# Patient Record
Sex: FEMALE | Race: OTHER | HISPANIC OR LATINO | Employment: FULL TIME | URBAN - METROPOLITAN AREA
[De-identification: names, ages, dates, MRNs, and addresses within clinical notes are randomized per-mention and may not be internally consistent; named-entity substitution may affect disease eponyms.]

---

## 2020-07-22 PROBLEM — U07.1 COVID-19 VIRUS DETECTED: Status: ACTIVE | Noted: 2020-07-22

## 2022-12-20 ENCOUNTER — OFFICE VISIT (OUTPATIENT)
Dept: URGENT CARE | Facility: PHYSICIAN GROUP | Age: 22
End: 2022-12-20
Payer: COMMERCIAL

## 2022-12-20 VITALS
WEIGHT: 222 LBS | BODY MASS INDEX: 36.99 KG/M2 | RESPIRATION RATE: 16 BRPM | SYSTOLIC BLOOD PRESSURE: 120 MMHG | HEIGHT: 65 IN | HEART RATE: 55 BPM | DIASTOLIC BLOOD PRESSURE: 64 MMHG | OXYGEN SATURATION: 97 % | TEMPERATURE: 98.4 F

## 2022-12-20 DIAGNOSIS — R11.0 NAUSEA: ICD-10-CM

## 2022-12-20 DIAGNOSIS — R10.13 EPIGASTRIC PAIN: ICD-10-CM

## 2022-12-20 LAB
APPEARANCE UR: NORMAL
BILIRUB UR STRIP-MCNC: NEGATIVE MG/DL
COLOR UR AUTO: YELLOW
GLUCOSE UR STRIP.AUTO-MCNC: NEGATIVE MG/DL
INT CON NEG: NEGATIVE
INT CON POS: POSITIVE
KETONES UR STRIP.AUTO-MCNC: 15 MG/DL
LEUKOCYTE ESTERASE UR QL STRIP.AUTO: NEGATIVE
NITRITE UR QL STRIP.AUTO: NEGATIVE
PH UR STRIP.AUTO: 5.5 [PH] (ref 5–8)
POC URINE PREGNANCY TEST: NEGATIVE
PROT UR QL STRIP: NEGATIVE MG/DL
RBC UR QL AUTO: NORMAL
SP GR UR STRIP.AUTO: 1.01
UROBILINOGEN UR STRIP-MCNC: 0.2 MG/DL

## 2022-12-20 PROCEDURE — 99213 OFFICE O/P EST LOW 20 MIN: CPT | Performed by: PHYSICIAN ASSISTANT

## 2022-12-20 PROCEDURE — 81025 URINE PREGNANCY TEST: CPT | Performed by: PHYSICIAN ASSISTANT

## 2022-12-20 PROCEDURE — 81002 URINALYSIS NONAUTO W/O SCOPE: CPT | Performed by: PHYSICIAN ASSISTANT

## 2022-12-20 RX ORDER — ONDANSETRON 4 MG/1
4 TABLET, ORALLY DISINTEGRATING ORAL EVERY 8 HOURS PRN
Qty: 10 TABLET | Refills: 0 | Status: SHIPPED | OUTPATIENT
Start: 2022-12-20 | End: 2023-01-03

## 2022-12-20 ASSESSMENT — ENCOUNTER SYMPTOMS
ABDOMINAL PAIN: 1
DIARRHEA: 1
VOMITING: 0
CONSTIPATION: 0
NAUSEA: 1

## 2022-12-20 ASSESSMENT — FIBROSIS 4 INDEX: FIB4 SCORE: 0.45

## 2022-12-20 NOTE — PROGRESS NOTES
Subjective:   Daisy Fitzpatrick is a 22 y.o. female who presents today with   Chief Complaint   Patient presents with    Abdominal Pain     X 1 week with pain in upper stomach after eating with discomfort and bubbling. Pt states that she has been  taking a lot of probiotics and started feeling better and now having muscle soreness on ribcage area for last couple of days         Abdominal Pain  This is a new problem. The current episode started in the past 7 days. The onset quality is gradual. The problem occurs constantly. The problem has been gradually improving since onset. The pain is located in the epigastric region. The pain is mild. The quality of the pain is described as cramping. Associated symptoms include diarrhea and nausea. Pertinent negatives include no constipation or vomiting. Treatments tried: Probiotics. The treatment provided mild relief.   Patient denies any recent travel or recent use of antibiotics.  Patient states her symptoms have resolved since yesterday but she still have the appointment and wanted to be evaluated.    PMH:  has no past medical history of Addisons disease ([facility]), Adrenal disorder ([facility]), Allergy, Anemia, Anxiety, Arrhythmia, Arthritis, Asthma, Blood transfusion without reported diagnosis, Cancer ([facility]), Cataract, CHF (congestive heart failure) ([facility]), Clotting disorder ([facility]), COPD (chronic obstructive pulmonary disease) ([facility]), Cushings syndrome ([facility]), Depression, Diabetes ([facility]), Diabetic neuropathy ([facility]), GERD (gastroesophageal reflux disease), Glaucoma, Goiter, Heart attack ([facility]), Heart murmur, HIV (human immunodeficiency virus infection) ([facility]), Hyperlipidemia, Hypertension, IBD (inflammatory bowel disease), Kidney disease, Meningitis, Migraine, Muscle disorder, Osteoporosis, Parathyroid disorder ([facility]), Pituitary disease ([facility]), Pulmonary emphysema ([facility]), Seizure ([facility]), Sickle cell disease ([facility]), Stroke ([facility]), Substance abuse ([facility]), or Tuberculosis.  MEDS:   Current  "Outpatient Medications:     ondansetron (ZOFRAN ODT) 4 MG TABLET DISPERSIBLE, Take 1 Tablet by mouth every 8 hours as needed for Nausea/Vomiting., Disp: 10 Tablet, Rfl: 0  ALLERGIES: No Known Allergies  SURGHX: No past surgical history on file.  SOCHX:  reports that she has never smoked. She has never used smokeless tobacco. She reports that she does not drink alcohol and does not use drugs.  FH: Reviewed with patient, not pertinent to this visit.       Review of Systems   Gastrointestinal:  Positive for abdominal pain, diarrhea and nausea. Negative for constipation and vomiting.      Objective:   /64 (BP Location: Left arm, Patient Position: Sitting, BP Cuff Size: Adult)   Pulse (!) 55   Temp 36.9 °C (98.4 °F) (Temporal)   Resp 16   Ht 1.651 m (5' 5\")   Wt 101 kg (222 lb)   SpO2 97%   BMI 36.94 kg/m²   Physical Exam  Vitals and nursing note reviewed.   Constitutional:       General: She is not in acute distress.     Appearance: Normal appearance. She is well-developed. She is not ill-appearing or toxic-appearing.   HENT:      Head: Normocephalic and atraumatic.      Right Ear: Hearing normal.      Left Ear: Hearing normal.   Cardiovascular:      Rate and Rhythm: Normal rate and regular rhythm.      Heart sounds: Normal heart sounds.   Pulmonary:      Effort: Pulmonary effort is normal.      Breath sounds: Normal breath sounds.   Abdominal:      General: Bowel sounds are normal. There is no distension.      Palpations: Abdomen is soft.      Tenderness: There is no abdominal tenderness. There is no right CVA tenderness, left CVA tenderness or guarding.   Musculoskeletal:      Comments: Normal movement in all 4 extremities   Skin:     General: Skin is warm and dry.   Neurological:      Mental Status: She is alert.      Coordination: Coordination normal.   Psychiatric:         Mood and Affect: Mood normal.     UA negative for infection  Pregnancy negative    Assessment/Plan:   Assessment    1. Epigastric " pain  - Referral to establish with [facility] PCP  - POCT Urinalysis  - POCT Pregnancy    2. Nausea  - ondansetron (ZOFRAN ODT) 4 MG TABLET DISPERSIBLE; Take 1 Tablet by mouth every 8 hours as needed for Nausea/Vomiting.  Dispense: 10 Tablet; Refill: 0  Symptoms and presentation are consistent with resolving epigastric pain and possible viral gastroenteritis.  We will send in Zofran to help with patient's nausea.  Recommend bland diet and replenishing fluids.  If symptoms persist recommend following up with primary care and referral placed today.  No indication for imaging or further work-up at this time.    Differential diagnosis, natural history, supportive care, and indications for immediate follow-up discussed.   Patient given instructions and understanding of medications and treatment.    If not improving in 3-5 days, F/U with PCP or return to  if symptoms worsen.    Patient agreeable to plan.      Please note that this dictation was created using voice recognition software. I have made every reasonable attempt to correct obvious errors, but I expect that there are errors of grammar and possibly content that I did not discover before finalizing the note.    Guille Hoskins PA-C

## 2022-12-30 ENCOUNTER — TELEPHONE (OUTPATIENT)
Dept: SCHEDULING | Facility: IMAGING CENTER | Age: 22
End: 2022-12-30

## 2023-01-03 ENCOUNTER — OFFICE VISIT (OUTPATIENT)
Dept: MEDICAL GROUP | Facility: PHYSICIAN GROUP | Age: 23
End: 2023-01-03
Attending: PHYSICIAN ASSISTANT
Payer: COMMERCIAL

## 2023-01-03 VITALS
OXYGEN SATURATION: 99 % | HEIGHT: 66 IN | RESPIRATION RATE: 16 BRPM | TEMPERATURE: 98 F | BODY MASS INDEX: 36 KG/M2 | WEIGHT: 224 LBS | DIASTOLIC BLOOD PRESSURE: 62 MMHG | HEART RATE: 88 BPM | SYSTOLIC BLOOD PRESSURE: 110 MMHG

## 2023-01-03 DIAGNOSIS — E66.9 OBESITY (BMI 35.0-39.9 WITHOUT COMORBIDITY): ICD-10-CM

## 2023-01-03 DIAGNOSIS — Z76.89 ENCOUNTER TO ESTABLISH CARE: ICD-10-CM

## 2023-01-03 DIAGNOSIS — Z23 NEED FOR VACCINATION: ICD-10-CM

## 2023-01-03 DIAGNOSIS — K21.9 GASTROESOPHAGEAL REFLUX DISEASE WITHOUT ESOPHAGITIS: ICD-10-CM

## 2023-01-03 PROCEDURE — 90471 IMMUNIZATION ADMIN: CPT | Performed by: NURSE PRACTITIONER

## 2023-01-03 PROCEDURE — 99214 OFFICE O/P EST MOD 30 MIN: CPT | Mod: 25 | Performed by: STUDENT IN AN ORGANIZED HEALTH CARE EDUCATION/TRAINING PROGRAM

## 2023-01-03 PROCEDURE — 90651 9VHPV VACCINE 2/3 DOSE IM: CPT | Performed by: NURSE PRACTITIONER

## 2023-01-03 RX ORDER — OMEPRAZOLE 20 MG/1
20 CAPSULE, DELAYED RELEASE ORAL 2 TIMES DAILY
Qty: 60 CAPSULE | Refills: 0 | Status: SHIPPED | OUTPATIENT
Start: 2023-01-03 | End: 2023-01-23 | Stop reason: SDUPTHER

## 2023-01-03 ASSESSMENT — FIBROSIS 4 INDEX: FIB4 SCORE: 0.45

## 2023-01-03 ASSESSMENT — PATIENT HEALTH QUESTIONNAIRE - PHQ9: CLINICAL INTERPRETATION OF PHQ2 SCORE: 0

## 2023-01-03 NOTE — PROGRESS NOTES
"Subjective:     CC:  establish care    HISTORY OF THE PRESENT ILLNESS: Patient is a 22 y.o. female here today to establish care.  Previous PCP was in [facility].    Patient complains of 3 week history of gas, bloating, \"sour stomach\", epigastric pain, nausea, and globus sensation in throat.  Patient denies fever, chills, vomiting, diarrhea, sour / metallic taste in mouth, and morning cough.  Patient was seen in urgent care on 12/20 and given Zofran, which she reports only helped with the nausea.  Patient reports symptoms are worse with spicy foods, dairy, caffeine, carbonation and she has tried to limit them.  Patient tried OTC antacids which provided some short term relief.    Health Maintenance: Completed  - agreed to Gardasil #3  - will return for pap smear    No Known Allergies  Patient Active Problem List   Diagnosis    COVID-19 virus detected    Obesity (BMI 35.0-39.9 without comorbidity)     Current Outpatient Medications   Medication Sig Dispense Refill    Norethindrone Acet-Ethinyl Est (MICROGESTIN 1/20 PO) Take  by mouth.      omeprazole (PRILOSEC) 20 MG delayed-release capsule Take 1 Capsule by mouth 2 times a day. 60 Capsule 0     No current facility-administered medications for this visit.     History reviewed. No pertinent surgical history.   Social History     Socioeconomic History    Marital status:      Spouse name: Not on file    Number of children: 0    Years of education: Not on file    Highest education level: Not on file   Occupational History    Occupation: [employer]   Tobacco Use    Smoking status: Never     Passive exposure: Never    Smokeless tobacco: Never   Vaping Use    Vaping Use: Never used   Substance and Sexual Activity    Alcohol use: Yes     Comment: on occasion    Drug use: No    Sexual activity: Yes     Partners: Male     Birth control/protection: Pill     Comment:    Other Topics Concern    Behavioral problems No    Interpersonal relationships No    Sad " or not enjoying activities No    Suicidal thoughts No    Poor school performance No    Reading difficulties No    Speech difficulties No    Writing difficulties No    Inadequate sleep No    Excessive TV viewing No    Excessive video game use No    Inadequate exercise No    Sports related No    Poor diet No    Family concerns for drug/alcohol abuse No    Poor oral hygiene No    Bike safety No    Family concerns vehicle safety No   Social History Narrative    Lives with . Moved from [facility].     Social Determinants of Health     Financial Resource Strain: Not on file   Food Insecurity: Not on file   Transportation Needs: Not on file   Physical Activity: Not on file   Stress: Not on file   Social Connections: Not on file   Intimate Partner Violence: Not on file   Housing Stability: Not on file     Family History   Problem Relation Age of Onset    Diabetes Mother     No Known Problems Father     Lung Disease Sister         asthma    Hypertension Maternal Grandmother     Cancer Neg Hx     Ovarian Cancer Neg Hx     Tubal Cancer Neg Hx     Peritoneal Cancer Neg Hx     Colorectal Cancer Neg Hx     Breast Cancer Neg Hx          ROS:     Constitutional:  Negative for chills, fever, fatigue, weight loss.  HEENT:  Negative for blurred vision, hearing loss, sore throat.    Respiratory:  Negative for cough, sputum production and shortness of breath.  Cardiovascular:  Negative for chest pain, palpitations and leg swelling.  Gastrointestinal:  Negative for abdominal pain, blood in stool, constipation, diarrhea and vomiting.   Musculoskeletal:  Negative for back pain, falls, joint pain and neck pain.   Skin:  Negative for rash.   Neurological:  Negative for dizziness, seizures, weakness and headaches.   Endo/Heme/Allergies:  Does not bruise/bleed easily.   Psychiatric/Behavioral:  Negative for depression, anxiety and suicidal thoughts.      Objective:     Exam: /62 (BP Location: Left arm, Patient Position: Sitting, BP  "Cuff Size: Adult)   Pulse 88   Temp 36.7 °C (98 °F) (Temporal)   Resp 16   Ht 1.664 m (5' 5.5\")   Wt 102 kg (224 lb)   SpO2 99%  Body mass index is 36.71 kg/m².    Gen: Alert and oriented, no acute distress.  Eyes:  PERRL, conjunctivae clear, lids normal.   Neck: Neck is supple, trachea middle, no palpable lymphadenopathy or thyromegaly.  Lungs: Normal effort, CTAB, no wheezing / rhonchi / rales.  CV: RRR, normal S1 and S2, no murmurs.  GI:  Abdomen soft, non-tender, non-distended with normal bowel sounds.  MSK:  Normal ROM.  Ext: No clubbing, cyanosis, or edema.  Skin:  Warm and dry with no rashes or lesions.  Neuro: AAO x 3, no acute focal deficits.  Psych: Normal affect and mood.      Assessment & Plan:   22 y.o. female with the following -    1. Encounter to establish care  Patient presents today to establish care.  Routine labs ordered.  Pap smear will be scheduled at the next visit.  - CBC WITHOUT DIFFERENTIAL; Future  - Comp Metabolic Panel; Future    2. Obesity (BMI 35.0-39.9 without comorbidity)  Chronic.  BMI 36.71.  Diabetes screening, TSH, and lipid panel ordered.  - Lipid Profile; Future  - TSH WITH REFLEX TO FT4; Future    3. Gastroesophageal reflux disease without esophagitis  This is a new, worsening problem with onset 3 weeks ago.  4 week trial of Omeprazole prescribed.  - omeprazole (PRILOSEC) 20 MG delayed-release capsule; Take 1 Capsule by mouth 2 times a day.  Dispense: 60 Capsule; Refill: 0    4. Need for vaccination  Given Gardasil #3.  - Gardasil 9          I spent a total of 36 minutes with record review, exam, communication with the patient, communication with other providers, and documentation of this encounter.    Return in about 4 weeks (around 1/31/2023) for Discuss medication, Discuss labs.    Please note that this dictation was created using voice recognition software. I have made every reasonable attempt to correct obvious errors, but I expect that there are errors of grammar " and possibly content that I did not discover before finalizing the note.

## 2023-01-04 NOTE — ASSESSMENT & PLAN NOTE
"Patient complains of 3 week history of gas, bloating, \"sour stomach\", epigastric pain, nausea, and globus sensation in throat.  Patient denies fever, chills, vomiting, diarrhea, sour / metallic taste in mouth, and morning cough.  Patient was seen in urgent care on 12/20 and given Zofran, which she reports only helped with the nausea.  Patient reports symptoms are worse with spicy foods, dairy, caffeine, carbonation and she has tried to limit them.  Patient tried OTC antacids which provided some short term relief. Given 4 week trial of Omeprazole 20 mg BID.  "

## 2023-01-17 ENCOUNTER — HOSPITAL ENCOUNTER (OUTPATIENT)
Dept: LAB | Facility: MEDICAL CENTER | Age: 23
End: 2023-01-17
Attending: STUDENT IN AN ORGANIZED HEALTH CARE EDUCATION/TRAINING PROGRAM
Payer: COMMERCIAL

## 2023-01-17 DIAGNOSIS — E66.9 OBESITY (BMI 35.0-39.9 WITHOUT COMORBIDITY): ICD-10-CM

## 2023-01-17 DIAGNOSIS — Z76.89 ENCOUNTER TO ESTABLISH CARE: ICD-10-CM

## 2023-01-17 LAB
ERYTHROCYTE [DISTWIDTH] IN BLOOD BY AUTOMATED COUNT: 46.8 FL (ref 35.9–50)
HCT VFR BLD AUTO: 39.7 % (ref 37–47)
HGB BLD-MCNC: 13 G/DL (ref 12–16)
MCH RBC QN AUTO: 31.3 PG (ref 27–33)
MCHC RBC AUTO-ENTMCNC: 32.7 G/DL (ref 33.6–35)
MCV RBC AUTO: 95.4 FL (ref 81.4–97.8)
PLATELET # BLD AUTO: 271 K/UL (ref 164–446)
PMV BLD AUTO: 11.1 FL (ref 9–12.9)
RBC # BLD AUTO: 4.16 M/UL (ref 4.2–5.4)
WBC # BLD AUTO: 6.5 K/UL (ref 4.8–10.8)

## 2023-01-17 PROCEDURE — 85027 COMPLETE CBC AUTOMATED: CPT

## 2023-01-17 PROCEDURE — 80053 COMPREHEN METABOLIC PANEL: CPT

## 2023-01-17 PROCEDURE — 80061 LIPID PANEL: CPT

## 2023-01-17 PROCEDURE — 84443 ASSAY THYROID STIM HORMONE: CPT

## 2023-01-17 PROCEDURE — 36415 COLL VENOUS BLD VENIPUNCTURE: CPT

## 2023-01-18 LAB
ALBUMIN SERPL BCP-MCNC: 4.3 G/DL (ref 3.2–4.9)
ALBUMIN/GLOB SERPL: 1.5 G/DL
ALP SERPL-CCNC: 60 U/L (ref 30–99)
ALT SERPL-CCNC: 15 U/L (ref 2–50)
ANION GAP SERPL CALC-SCNC: 9 MMOL/L (ref 7–16)
AST SERPL-CCNC: 16 U/L (ref 12–45)
BILIRUB SERPL-MCNC: 0.6 MG/DL (ref 0.1–1.5)
BUN SERPL-MCNC: 8 MG/DL (ref 8–22)
CALCIUM ALBUM COR SERPL-MCNC: 8.8 MG/DL (ref 8.5–10.5)
CALCIUM SERPL-MCNC: 9 MG/DL (ref 8.5–10.5)
CHLORIDE SERPL-SCNC: 106 MMOL/L (ref 96–112)
CHOLEST SERPL-MCNC: 147 MG/DL (ref 100–199)
CO2 SERPL-SCNC: 24 MMOL/L (ref 20–33)
CREAT SERPL-MCNC: 0.59 MG/DL (ref 0.5–1.4)
FASTING STATUS PATIENT QL REPORTED: NORMAL
GFR SERPLBLD CREATININE-BSD FMLA CKD-EPI: 130 ML/MIN/1.73 M 2
GLOBULIN SER CALC-MCNC: 2.8 G/DL (ref 1.9–3.5)
GLUCOSE SERPL-MCNC: 79 MG/DL (ref 65–99)
HDLC SERPL-MCNC: 35 MG/DL
LDLC SERPL CALC-MCNC: 100 MG/DL
POTASSIUM SERPL-SCNC: 3.8 MMOL/L (ref 3.6–5.5)
PROT SERPL-MCNC: 7.1 G/DL (ref 6–8.2)
SODIUM SERPL-SCNC: 139 MMOL/L (ref 135–145)
TRIGL SERPL-MCNC: 61 MG/DL (ref 0–149)
TSH SERPL DL<=0.005 MIU/L-ACNC: 2.51 UIU/ML (ref 0.38–5.33)

## 2023-01-23 ENCOUNTER — HOSPITAL ENCOUNTER (OUTPATIENT)
Dept: LAB | Facility: MEDICAL CENTER | Age: 23
End: 2023-01-23
Attending: STUDENT IN AN ORGANIZED HEALTH CARE EDUCATION/TRAINING PROGRAM
Payer: COMMERCIAL

## 2023-01-23 ENCOUNTER — OFFICE VISIT (OUTPATIENT)
Dept: MEDICAL GROUP | Facility: PHYSICIAN GROUP | Age: 23
End: 2023-01-23
Payer: COMMERCIAL

## 2023-01-23 VITALS
HEIGHT: 65 IN | HEART RATE: 74 BPM | DIASTOLIC BLOOD PRESSURE: 78 MMHG | OXYGEN SATURATION: 95 % | TEMPERATURE: 98.1 F | BODY MASS INDEX: 36.32 KG/M2 | WEIGHT: 218 LBS | SYSTOLIC BLOOD PRESSURE: 108 MMHG | RESPIRATION RATE: 14 BRPM

## 2023-01-23 DIAGNOSIS — K21.9 GASTROESOPHAGEAL REFLUX DISEASE WITHOUT ESOPHAGITIS: ICD-10-CM

## 2023-01-23 DIAGNOSIS — Z23 NEED FOR VACCINATION: ICD-10-CM

## 2023-01-23 DIAGNOSIS — E66.9 OBESITY (BMI 35.0-39.9 WITHOUT COMORBIDITY): ICD-10-CM

## 2023-01-23 PROCEDURE — 90715 TDAP VACCINE 7 YRS/> IM: CPT | Performed by: STUDENT IN AN ORGANIZED HEALTH CARE EDUCATION/TRAINING PROGRAM

## 2023-01-23 PROCEDURE — 99214 OFFICE O/P EST MOD 30 MIN: CPT | Mod: 25 | Performed by: STUDENT IN AN ORGANIZED HEALTH CARE EDUCATION/TRAINING PROGRAM

## 2023-01-23 PROCEDURE — 90471 IMMUNIZATION ADMIN: CPT | Performed by: STUDENT IN AN ORGANIZED HEALTH CARE EDUCATION/TRAINING PROGRAM

## 2023-01-23 PROCEDURE — 83013 H PYLORI (C-13) BREATH: CPT

## 2023-01-23 RX ORDER — OMEPRAZOLE 20 MG/1
20 CAPSULE, DELAYED RELEASE ORAL 2 TIMES DAILY
Qty: 60 CAPSULE | Refills: 2 | Status: SHIPPED | OUTPATIENT
Start: 2023-01-23

## 2023-01-23 ASSESSMENT — FIBROSIS 4 INDEX: FIB4 SCORE: 0.34

## 2023-01-23 NOTE — LETTER
[facility]  Hoda Wayne M.D.  [address] [place] [address]  Fax: [phone]   Authorization for Release/Disclosure of   Protected Health Information   Name: CHU BARRY : 2000 SSN: [id number]   Address: [address] [place] [address] [address]  [place] [address] Phone:    [phone] (home)    I authorize the entity listed below to release/disclose the PHI below to:   [facility]/Hoda Wayne M.D. and Hoda Wayne M.D.   Provider or Entity Name:  [facility] (ER visit on 23)   Address   City, [facility], Zip  [address] [place] [address]    Phone:      Fax:     Reason for request: continuity of care   Information to be released:    [  ] LAST COLONOSCOPY,  including any PATH REPORT and follow-up  [  ] LAST FIT/COLOGUARD RESULT [  ] LAST DEXA  [  ] LAST MAMMOGRAM  [  ] LAST PAP  [  ] LAST LABS [  ] RETINA EXAM REPORT  [  ] IMMUNIZATION RECORDS  [ X ] Release all info      [  ] Check here and initial the line next to each item to release ALL health information INCLUDING  _____ Care and treatment for drug and / or alcohol abuse  _____ HIV testing, infection status, or AIDS  _____ Genetic Testing    DATES OF SERVICE OR TIME PERIOD TO BE DISCLOSED: _____________  I understand and acknowledge that:  * This Authorization may be revoked at any time by you in writing, except if your health information has already been used or disclosed.  * Your health information that will be used or disclosed as a result of you signing this authorization could be re-disclosed by the recipient. If this occurs, your re-disclosed health information may no longer be protected by State or Federal laws.  * You may refuse to sign this Authorization. Your refusal will not affect your ability to obtain treatment.  * This Authorization becomes effective upon signing and will  on (date) __________.      If no date is indicated, this Authorization will  one (1) year from the signature date.    Name:  Daisy Fitzpatrick    Signature:   Date:     1/23/2023       PLEASE FAX REQUESTED RECORDS BACK TO: [phone]

## 2023-01-23 NOTE — PROGRESS NOTES
"Subjective:     CC: discuss labs and GERD    HPI:   Daisy presents today with    Gastroesophageal reflux disease without esophagitis  At the last visit patient complained of gas, bloating, \"sour stomach\", epigastric pain, nausea, and globus sensation in throat.  Patient was seen in urgent care on 12/20/22 and given Zofran, which only helped with the nausea.  Patient reported symptoms are worse with spicy foods, dairy, caffeine, and carbonation.  Patient tried OTC antacids which provided some short term relief. At the last visit patient was prescribed 4 week trial of Omeprazole 20 mg twice daily.  Today patient reports significant improvement in symptoms but she still experiences epigastric discomfort after she eats or when laying flat at night.  Patient denies recent travel but reports she recently ate meat and vegetables from [place].  Patient also reports she went to [facility] ER for left-sided chest discomfort on 1/12/23 (records requested).      Health Maintenance: Completed    ROS:  Constitutional:  Negative for chills, fever, fatigue, weight loss.  HEENT:  Negative for blurred vision, hearing loss, sore throat.    Respiratory:  Negative for cough, sputum production and shortness of breath.    Cardiovascular:  Negative for chest pain, palpitations and leg swelling.   Gastrointestinal:  Negative for abdominal pain, blood in stool, constipation, diarrhea and vomiting.   Musculoskeletal:  Negative for back pain, falls, joint pain and neck pain.   Skin:  Negative for rash.   Neurological:  Negative for dizziness, seizures, weakness and headaches.   Endo/Heme/Allergies:  Does not bruise/bleed easily.   Psychiatric/Behavioral:  Negative for depression, anxiety and suicidal thoughts.    Objective:     Exam:  /78   Pulse 74   Temp 36.7 °C (98.1 °F) (Temporal)   Resp 14   Ht 1.651 m (5' 5\")   Wt 98.9 kg (218 lb)   LMP 01/09/2023   SpO2 95%   BMI 36.28 kg/m²  Body mass index is 36.28 " kg/m².    Physical Exam    Gen: Alert and oriented, no acute distress.  Lungs: Normal effort, CTAB, no wheezing / rhonchi / rales.  CV: RRR, normal S1 and S2, no murmurs.      Labs:   Hospital Outpatient Visit on 01/17/2023   Component Date Value Ref Range Status    TSH 01/17/2023 2.510  0.380 - 5.330 uIU/mL Final    Comment: The 2011 American Thyroid Association (JENNYFER) guidelines  recommended that the interpretation of thyroid function in  pregnancy be based on trimester specific reference ranges.    1st Trimester  0.100-2.500 mIU/L  2nd Trimester  0.200-3.000 mIU/L  3rd Trimester  0.300-3.500 mIU/L    These established reference ranges have not been validated  at [facility].      Cholesterol,Tot 01/17/2023 147  100 - 199 mg/dL Final    Triglycerides 01/17/2023 61  0 - 149 mg/dL Final    HDL 01/17/2023 35 (A)  >=40 mg/dL Final    LDL 01/17/2023 100 (H)  <100 mg/dL Final    Sodium 01/17/2023 139  135 - 145 mmol/L Final    Potassium 01/17/2023 3.8  3.6 - 5.5 mmol/L Final    Chloride 01/17/2023 106  96 - 112 mmol/L Final    Co2 01/17/2023 24  20 - 33 mmol/L Final    Anion Gap 01/17/2023 9.0  7.0 - 16.0 Final    Glucose 01/17/2023 79  65 - 99 mg/dL Final    Bun 01/17/2023 8  8 - 22 mg/dL Final    Creatinine 01/17/2023 0.59  0.50 - 1.40 mg/dL Final    Calcium 01/17/2023 9.0  8.5 - 10.5 mg/dL Final    AST(SGOT) 01/17/2023 16  12 - 45 U/L Final    ALT(SGPT) 01/17/2023 15  2 - 50 U/L Final    Alkaline Phosphatase 01/17/2023 60  30 - 99 U/L Final    Total Bilirubin 01/17/2023 0.6  0.1 - 1.5 mg/dL Final    Albumin 01/17/2023 4.3  3.2 - 4.9 g/dL Final    Total Protein 01/17/2023 7.1  6.0 - 8.2 g/dL Final    Globulin 01/17/2023 2.8  1.9 - 3.5 g/dL Final    A-G Ratio 01/17/2023 1.5  g/dL Final    WBC 01/17/2023 6.5  4.8 - 10.8 K/uL Final    RBC 01/17/2023 4.16 (L)  4.20 - 5.40 M/uL Final    Hemoglobin 01/17/2023 13.0  12.0 - 16.0 g/dL Final    Hematocrit 01/17/2023 39.7  37.0 - 47.0 % Final    MCV 01/17/2023 95.4   81.4 - 97.8 fL Final    MCH 01/17/2023 31.3  27.0 - 33.0 pg Final    MCHC 01/17/2023 32.7 (L)  33.6 - 35.0 g/dL Final    RDW 01/17/2023 46.8  35.9 - 50.0 fL Final    Platelet Count 01/17/2023 271  164 - 446 K/uL Final    MPV 01/17/2023 11.1  9.0 - 12.9 fL Final    Fasting Status 01/17/2023 Fasting   Final    Correct Calcium 01/17/2023 8.8  8.5 - 10.5 mg/dL Final    GFR (CKD-EPI) 01/17/2023 130  >60 mL/min/1.73 m 2 Final    Comment: Estimated Glomerular Filtration Rate is calculated using  race neutral CKD-EPI 2021 equation per NKF-ASN recommendations.           Assessment & Plan:     22 y.o. female with the following -     1. Gastroesophageal reflux disease without esophagitis  Chronic.  Patient reports improvement in symptoms after 4 week trial of PPI but still complains of epigastric discomfort after eating or when laying flat.  Patient was recently evaluated in the ER for chest discomfort and records requested from [facility].  H pylori urea breath test ordered due to patient eating food from [place].  Patient was advised to sleep with wedge pillow.  Continue Omeprazole 20 mg twice daily.  Given referral to GI for further evaluation and treatment.  - omeprazole (PRILOSEC) 20 MG delayed-release capsule; Take 1 Capsule by mouth 2 times a day.  Dispense: 60 Capsule; Refill: 2  - H. PYLORI, UREA BREATH TEST, ADULT; Future  - Referral to Gastroenterology    2. Obesity (BMI 35.0-39.9 without comorbidity)  Chronic.  BMI 36.28 today.  Screening for thyroid, diabetes and cholesterol normal.  Encouraged healthy diet and 150 minutes of exercise weekly.    3. Need for vaccination  - Tdap =>8yo IM          I spent a total of 30 minutes with record review, exam, communication with the patient, communication with other providers, and documentation of this encounter.      Return in about 3 months (around 4/23/2023) for Gyn exam.    Please note that this dictation was created using voice recognition software. I have made  every reasonable attempt to correct obvious errors, but I expect that there are errors of grammar and possibly content that I did not discover before finalizing the note.

## 2023-01-23 NOTE — ASSESSMENT & PLAN NOTE
"At the last visit patient complained of gas, bloating, \"sour stomach\", epigastric pain, nausea, and globus sensation in throat.  Patient was seen in urgent care on 12/20/22 and given Zofran, which only helped with the nausea.  Patient reported symptoms are worse with spicy foods, dairy, caffeine, and carbonation.  Patient tried OTC antacids which provided some short term relief. At the last visit patient was prescribed 4 week trial of Omeprazole 20 mg twice daily.  Today patient reports significant improvement in symptoms but she still experiences epigastric discomfort after she eats or when laying flat at night.  Patient denies recent travel but reports she recently ate meat and vegetables from [place].  Patient also reports she went to [facility] ER for left-sided chest discomfort on 1/12/23 (records requested).  "

## 2023-01-24 LAB — UREA BREATH TEST QL: POSITIVE

## 2023-01-25 PROBLEM — A04.8 H. PYLORI INFECTION: Status: ACTIVE | Noted: 2023-01-25

## 2023-01-26 ENCOUNTER — TELEMEDICINE (OUTPATIENT)
Dept: MEDICAL GROUP | Facility: PHYSICIAN GROUP | Age: 23
End: 2023-01-26
Payer: COMMERCIAL

## 2023-01-26 VITALS — RESPIRATION RATE: 13 BRPM | WEIGHT: 218 LBS | BODY MASS INDEX: 36.32 KG/M2 | HEIGHT: 65 IN

## 2023-01-26 DIAGNOSIS — A04.8 H. PYLORI INFECTION: ICD-10-CM

## 2023-01-26 PROCEDURE — 99213 OFFICE O/P EST LOW 20 MIN: CPT | Mod: 95 | Performed by: STUDENT IN AN ORGANIZED HEALTH CARE EDUCATION/TRAINING PROGRAM

## 2023-01-26 RX ORDER — AMOXICILLIN 500 MG/1
1000 CAPSULE ORAL 2 TIMES DAILY
Qty: 56 CAPSULE | Refills: 0 | Status: SHIPPED | OUTPATIENT
Start: 2023-01-26 | End: 2023-02-09

## 2023-01-26 RX ORDER — CLARITHROMYCIN 500 MG/1
500 TABLET, COATED ORAL 2 TIMES DAILY
Qty: 28 TABLET | Refills: 0 | Status: SHIPPED | OUTPATIENT
Start: 2023-01-26 | End: 2023-02-09

## 2023-01-26 ASSESSMENT — FIBROSIS 4 INDEX: FIB4 SCORE: 0.34

## 2023-01-26 NOTE — PROGRESS NOTES
Virtual Visit: Established Patient   This visit was conducted via Zoom using secure and encrypted videoconferencing technology.   The patient was in their home in the state [facility].    The patient's identity was confirmed and verbal consent was obtained for this virtual visit.    Subjective:   CC:   Chief Complaint   Patient presents with    Follow-Up     Hpylori treatment       Daisy Fitzpatrick is a 22 y.o. female presenting for evaluation and management of H. Pylori.  Urea breath test positive on 1/23/23.  Patient had eaten food from [place].    ROS   Constitutional:  Negative for chills, fever, fatigue, weight loss.  HEENT:  Negative for blurred vision, hearing loss, sore throat.    Respiratory:  Negative for cough, sputum production and shortness of breath.    Cardiovascular:  Negative for chest pain, palpitations and leg swelling.   Gastrointestinal:  Positive for epigastric discomfort but negative for abdominal pain, blood in stool, constipation, diarrhea and vomiting.   Musculoskeletal:  Negative for back pain, falls, joint pain and neck pain.   Skin:  Negative for rash.   Neurological:  Negative for dizziness, seizures, weakness and headaches.   Endo/Heme/Allergies:  Does not bruise/bleed easily.   Psychiatric/Behavioral:  Negative for depression, anxiety and suicidal thoughts.    Current medicines (including changes today)  Current Outpatient Medications   Medication Sig Dispense Refill    clarithromycin (BIAXIN) 500 MG Tab Take 1 Tablet by mouth 2 times a day for 14 days. 28 Tablet 0    amoxicillin (AMOXIL) 500 MG Cap Take 2 Capsules by mouth 2 times a day for 14 days. Total = 1000 mg twice daily 56 Capsule 0    omeprazole (PRILOSEC) 20 MG delayed-release capsule Take 1 Capsule by mouth 2 times a day. 60 Capsule 2    Norethindrone Acet-Ethinyl Est (MICROGESTIN 1/20 PO) Take  by mouth.       No current facility-administered medications for this visit.       Patient Active Problem List    Diagnosis Date  "Noted    H. pylori infection 01/25/2023    Obesity (BMI 35.0-39.9 without comorbidity) 01/03/2023    Gastroesophageal reflux disease without esophagitis 01/03/2023    COVID-19 virus detected 07/22/2020        Objective:   Resp 13   Ht 1.651 m (5' 5\")   Wt 98.9 kg (218 lb)   LMP 01/09/2023   BMI 36.28 kg/m²     Physical Exam:  Constitutional: Alert, no distress, well-groomed.  Skin: No rashes in visible areas.  Eye: Round. Conjunctiva clear, lids normal. No icterus.   ENMT: Lips pink without lesions, good dentition, moist mucous membranes. Phonation normal.  Neck: No masses, no thyromegaly. Moves freely without pain.  Respiratory: Unlabored respiratory effort, no cough or audible wheeze  Psych: Alert and oriented x3, normal affect and mood.     Assessment and Plan:   The following treatment plan was discussed:     1. H. pylori infection  This is a new problem.  Urea breath test positive on 1/23/23.  Patient had eaten food from [place].  Diagnosis was discussed with the patient.  Continue omeprazole 20 mg twice daily.  Prescribed clarithromycin 500 mg twice daily and amoxicillin 1000 mg twice daily for 14 days.  These antibiotics should not interact with her OCP.  Patient was also advised to start probiotics.  Retest for eradication 4 weeks after completion of treatment.  - clarithromycin (BIAXIN) 500 MG Tab; Take 1 Tablet by mouth 2 times a day for 14 days.  Dispense: 28 Tablet; Refill: 0  - amoxicillin (AMOXIL) 500 MG Cap; Take 2 Capsules by mouth 2 times a day for 14 days. Total = 1000 mg twice daily  Dispense: 56 Capsule; Refill: 0      Follow-up: Return in about 2 weeks (around 2/9/2023) for follow-up H pylori.         "

## 2023-01-26 NOTE — PROGRESS NOTES
"Subjective:     CC: ***    HPI:   Daisy presents today with    probiotic    Problem   H. Pylori Infection       Health Maintenance: {COMPLETED:075110}    ROS:  ROS    Objective:     Exam:  Resp 13   Ht 1.651 m (5' 5\")   Wt 98.9 kg (218 lb)   LMP 01/09/2023   BMI 36.28 kg/m²  Body mass index is 36.28 kg/m².    Physical Exam    A chaperone was offered to the patient during today's exam. {CHAPERONE:37769}    Labs: ***    Assessment & Plan:     22 y.o. female with the following -     ***        Referral for genetic research was offered. Patient {declined/accepted}.    I spent a total of *** minutes with record review, exam, communication with the patient, communication with other providers, and documentation of this encounter.      No follow-ups on file.    Please note that this dictation was created using voice recognition software. I have made every reasonable attempt to correct obvious errors, but I expect that there are errors of grammar and possibly content that I did not discover before finalizing the note.        "

## 2023-02-09 ENCOUNTER — OFFICE VISIT (OUTPATIENT)
Dept: MEDICAL GROUP | Facility: PHYSICIAN GROUP | Age: 23
End: 2023-02-09
Payer: COMMERCIAL

## 2023-02-09 VITALS
RESPIRATION RATE: 15 BRPM | WEIGHT: 210 LBS | HEART RATE: 70 BPM | OXYGEN SATURATION: 97 % | SYSTOLIC BLOOD PRESSURE: 118 MMHG | TEMPERATURE: 97.8 F | DIASTOLIC BLOOD PRESSURE: 74 MMHG | HEIGHT: 65 IN | BODY MASS INDEX: 34.99 KG/M2

## 2023-02-09 DIAGNOSIS — A04.8 H. PYLORI INFECTION: ICD-10-CM

## 2023-02-09 PROCEDURE — 99213 OFFICE O/P EST LOW 20 MIN: CPT | Performed by: STUDENT IN AN ORGANIZED HEALTH CARE EDUCATION/TRAINING PROGRAM

## 2023-02-09 ASSESSMENT — FIBROSIS 4 INDEX: FIB4 SCORE: 0.34

## 2023-02-09 NOTE — ASSESSMENT & PLAN NOTE
Urea breath test positive on 1/23/23.  Patient had eaten food from [place].  Patient was treated with omeprazole 20 mg twice daily, clarithromycin 500 mg twice daily and amoxicillin 1000 mg twice daily for 14 days.  Patient reports improvement in symptoms but complains of abdominal discomfort and constipation.

## 2023-02-09 NOTE — PROGRESS NOTES
"Subjective:     CC: H pylori    HPI:   Daisy presents today with    H. pylori infection  Urea breath test positive on 1/23/23.  Patient had eaten food from [place].  Patient was treated with omeprazole 20 mg twice daily, clarithromycin 500 mg twice daily and amoxicillin 1000 mg twice daily for 14 days.  Patient reports improvement in symptoms but complains of abdominal discomfort and constipation.    Health Maintenance: Completed    ROS:  Negative except as stated above.      Objective:     Exam:  /74   Pulse 70   Temp 36.6 °C (97.8 °F) (Temporal)   Resp 15   Ht 1.651 m (5' 5\")   Wt 95.3 kg (210 lb)   LMP 02/04/2023   SpO2 97%   BMI 34.95 kg/m²  Body mass index is 34.95 kg/m².    Physical Exam    Gen: Alert and oriented, no acute distress.  Lungs: Normal effort, CTAB, no wheezing / rhonchi / rales.  CV: RRR, normal S1 and S2, no murmurs.      Assessment & Plan:     22 y.o. female with the following -     1. H. pylori infection  Acute.  Patient was treated with omeprazole 20 mg twice daily, clarithromycin 500 mg twice daily and amoxicillin 1000 mg twice daily for 14 days.  Patient will complete triple therapy today.  Retest for eradication in 4 weeks and patient was advised to stop PPI 1-2 wks before retest.  Patient was previously given referral to GI and will schedule an appointment after the retest.  - H. PYLORI, UREA BREATH TEST, ADULT; Future        Return in about 2 months (around 4/19/2023).    Please note that this dictation was created using voice recognition software. I have made every reasonable attempt to correct obvious errors, but I expect that there are errors of grammar and possibly content that I did not discover before finalizing the note.        "

## 2023-03-10 ENCOUNTER — HOSPITAL ENCOUNTER (OUTPATIENT)
Dept: LAB | Facility: MEDICAL CENTER | Age: 23
End: 2023-03-10
Attending: STUDENT IN AN ORGANIZED HEALTH CARE EDUCATION/TRAINING PROGRAM
Payer: COMMERCIAL

## 2023-03-10 DIAGNOSIS — A04.8 H. PYLORI INFECTION: ICD-10-CM

## 2023-03-10 PROCEDURE — 83013 H PYLORI (C-13) BREATH: CPT

## 2023-03-11 LAB — UREA BREATH TEST QL: NEGATIVE

## 2023-04-19 ENCOUNTER — HOSPITAL ENCOUNTER (OUTPATIENT)
Facility: MEDICAL CENTER | Age: 23
End: 2023-04-19
Attending: STUDENT IN AN ORGANIZED HEALTH CARE EDUCATION/TRAINING PROGRAM
Payer: COMMERCIAL

## 2023-04-19 ENCOUNTER — OFFICE VISIT (OUTPATIENT)
Dept: MEDICAL GROUP | Facility: PHYSICIAN GROUP | Age: 23
End: 2023-04-19
Payer: COMMERCIAL

## 2023-04-19 VITALS
SYSTOLIC BLOOD PRESSURE: 110 MMHG | WEIGHT: 215 LBS | TEMPERATURE: 97.5 F | OXYGEN SATURATION: 99 % | HEIGHT: 66 IN | HEART RATE: 66 BPM | DIASTOLIC BLOOD PRESSURE: 68 MMHG | BODY MASS INDEX: 34.55 KG/M2 | RESPIRATION RATE: 19 BRPM

## 2023-04-19 DIAGNOSIS — Z11.3 SCREENING EXAMINATION FOR SEXUALLY TRANSMITTED DISEASE: ICD-10-CM

## 2023-04-19 DIAGNOSIS — E66.9 OBESITY (BMI 30.0-34.9): ICD-10-CM

## 2023-04-19 DIAGNOSIS — Z01.419 WELL WOMAN EXAM: ICD-10-CM

## 2023-04-19 PROCEDURE — 99395 PREV VISIT EST AGE 18-39: CPT | Performed by: STUDENT IN AN ORGANIZED HEALTH CARE EDUCATION/TRAINING PROGRAM

## 2023-04-19 PROCEDURE — 87491 CHLMYD TRACH DNA AMP PROBE: CPT

## 2023-04-19 PROCEDURE — 87591 N.GONORRHOEAE DNA AMP PROB: CPT

## 2023-04-19 PROCEDURE — 88175 CYTOPATH C/V AUTO FLUID REDO: CPT

## 2023-04-19 RX ORDER — SUCRALFATE 1 G/1
TABLET ORAL
COMMUNITY
Start: 2023-04-14 | End: 2024-03-13

## 2023-04-19 ASSESSMENT — FIBROSIS 4 INDEX: FIB4 SCORE: 0.34

## 2023-04-19 NOTE — PROGRESS NOTES
Subjective:     CC:   Chief Complaint   Patient presents with    Gynecologic Exam     With breast exam       HPI:   Daisy Fitzpatrick is a 22 y.o. female who presents for annual exam. She is feeling well and denies any complaints.    OBGYN/ History:    Patient has GYN provider: no  /Para:    Last Pap Smear:  N/A  Gyn Surgery:  none.  Current Contraceptive Method:  OCP, currently sexually active with .  Last menstrual period:  Regular on OCP    Health Maintenance  Diet / Exercise:  BMI 34.7 today.  Patient reports history of facial hair, difficulty losing weight, and irregular periods.  Currently on OCP and worried she may have PCOS.  Smoking: denies  Substance Abuse: denies  Safe in relationship: yes,   Dentist: has not seen one but denies issues  Ophthalmology: has not seen one but denies issues    Infectious disease screening/Immunizations  --STI Screening: done today  --Practices safe sex.  --HIV Screening: negative 21  --Hepatitis C Screening: negative 21  --Immunizations:    HPV:  UTD   Tetanus: UTD   Hepatitis B: UTD    She  has a past medical history of Gastroesophageal reflux disease without esophagitis (1/3/2023).    She has no past medical history of Addisons disease (HCC), Adrenal disorder (HCC), Allergy, Anemia, Anxiety, Arrhythmia, Arthritis, Asthma, Blood transfusion without reported diagnosis, Cancer (HCC), Cataract, CHF (congestive heart failure) (HCC), Clotting disorder (HCC), COPD (chronic obstructive pulmonary disease) (HCC), Cushings syndrome (HCC), Depression, Diabetes (HCC), Diabetic neuropathy (HCC), Glaucoma, Goiter, Heart attack (HCC), Heart murmur, HIV (human immunodeficiency virus infection) (HCC), Hyperlipidemia, Hypertension, IBD (inflammatory bowel disease), Kidney disease, Meningitis, Migraine, Muscle disorder, Osteoporosis, Parathyroid disorder (HCC), Pituitary disease (HCC), Pulmonary emphysema (HCC), Seizure (HCC), Sickle cell disease (HCC),  Stroke (HCC), Substance abuse (HCC), or Tuberculosis.  She  has no past surgical history on file.    Family History   Problem Relation Age of Onset    Diabetes Mother     No Known Problems Father     Lung Disease Sister         asthma    Hypertension Maternal Grandmother     Cancer Neg Hx     Ovarian Cancer Neg Hx     Tubal Cancer Neg Hx     Peritoneal Cancer Neg Hx     Colorectal Cancer Neg Hx     Breast Cancer Neg Hx        Social History     Socioeconomic History    Marital status:      Spouse name: Not on file    Number of children: 0    Years of education: Not on file    Highest education level: Not on file   Occupational History    Occupation: [employer]   Tobacco Use    Smoking status: Never     Passive exposure: Never    Smokeless tobacco: Never   Vaping Use    Vaping Use: Never used   Substance and Sexual Activity    Alcohol use: Yes     Comment: on occasion    Drug use: No    Sexual activity: Yes     Partners: Male     Birth control/protection: Pill     Comment:    Other Topics Concern    Behavioral problems No    Interpersonal relationships No    Sad or not enjoying activities No    Suicidal thoughts No    Poor school performance No    Reading difficulties No    Speech difficulties No    Writing difficulties No    Inadequate sleep No    Excessive TV viewing No    Excessive video game use No    Inadequate exercise No    Sports related No    Poor diet No    Family concerns for drug/alcohol abuse No    Poor oral hygiene No    Bike safety No    Family concerns vehicle safety No   Social History Narrative    Lives with . Moved from [facility].     Social Determinants of Health     Financial Resource Strain: Not on file   Food Insecurity: Not on file   Transportation Needs: Not on file   Physical Activity: Not on file   Stress: Not on file   Social Connections: Not on file   Intimate Partner Violence: Not on file   Housing Stability: Not on file       Patient Active Problem List  "   Diagnosis Date Noted    H. pylori infection 01/25/2023    Obesity (BMI 35.0-39.9 without comorbidity) 01/03/2023    Gastroesophageal reflux disease without esophagitis 01/03/2023    COVID-19 virus detected 07/22/2020         Current Outpatient Medications   Medication Sig Dispense Refill    sucralfate (CARAFATE) 1 GM Tab       omeprazole (PRILOSEC) 20 MG delayed-release capsule Take 1 Capsule by mouth 2 times a day. 60 Capsule 2    Norethindrone Acet-Ethinyl Est (MICROGESTIN 1/20 PO) Take  by mouth.       No current facility-administered medications for this visit.     No Known Allergies    Review of Systems   Constitutional: Negative for fever, chills and malaise/fatigue.   HENT: Negative for congestion.    Eyes: Negative for pain or discharge.    Respiratory: Negative for cough and shortness of breath.  Cardiovascular: Negative for leg swelling.   Gastrointestinal: Negative for nausea, vomiting, abdominal pain and diarrhea.   Genitourinary: Negative for dysuria and hematuria.   Skin: Negative for rash.   Neurological: Negative for dizziness, focal weakness and headaches.   Endo/Heme/Allergies: Does not bleed easily.   Psychiatric/Behavioral: Negative for depression, anxiety, and suicidal thoughts.    Objective:     /68   Pulse 66   Temp 36.4 °C (97.5 °F) (Temporal)   Resp 19   Ht 1.676 m (5' 6\")   Wt 97.5 kg (215 lb)   LMP  (LMP Unknown)   SpO2 99%   BMI 34.70 kg/m²   Body mass index is 34.7 kg/m².  Wt Readings from Last 4 Encounters:   04/19/23 97.5 kg (215 lb)   02/09/23 95.3 kg (210 lb)   01/26/23 98.9 kg (218 lb)   01/23/23 98.9 kg (218 lb)       Physical Exam:  Constitutional: Well-developed and well-nourished. No acute distress.   Skin: Skin is warm and dry. No rash noted.  Neck: Supple, trachea midline. Normal range of motion. No thyromegaly present. No lymphadenopathy--cervical or supraclavicular.  Cardiovascular: Regular rate and rhythm, S1 and S2 without murmur, rubs, or gallops.  Lungs: " Normal inspiratory effort, CTA bilaterally, no wheezes/rhonchi/rales  Breast: Breasts examined seated and supine. No skin changes, peau d'orange or nipple retraction. No discharge. No axillary or supraclavicular adenopathy. No masses or nodularity palpable.  Abdomen: Soft, non tender, and without distention. Active bowel sounds.  : Perineum and external genitalia normal without rash. Vagina with normal and physiologic discharge. Cervix without visible lesions or discharge. Bimanual exam without adnexal masses or cervical motion tenderness.  Extremities: No cyanosis, clubbing, erythema, or edema. Distal pulses intact and symmetric.   Musculoskeletal: Normal ROM in all extremities.  Neurological: Alert and oriented x 3. No acute focal deficits.   Psychiatric:  Behavior, mood, and affect are appropriate.    A chaperone was offered to the patient during today's exam. Chaperone name: Wendy was present.    Assessment and Plan:     1. Well woman exam  Pap smear and breast exam done today.  - THINPREP PAP, REFLEX HPV ON ASC-US AND ABOVE; Future    2. Obesity (BMI 30.0-34.9)  Chronic, uncontrolled.  BMI 34.7 today.  Patient reports history of facial hair, difficulty losing weight, and irregular periods (currently on OCP).  Testosterone level ordered to screen for PCOS.  - TESTOSTERONE F&T FEMALES/CHILD; Future    3. Screening examination for sexually transmitted disease  - Chlamydia/GC, PCR (Genital/Anal swab); Future      Anticipatory guidance  --Discussed moderation in sodium/caffeine intake, saturated fat and cholesterol, caloric balance, sufficient fresh fruits/vegetables.  --Discussed brushing, flossing, and dental visits.   --Encouraged 150 minutes of exercise weekly.   --Discussed tobacco, alcohol, and other drug use.   --Discussed sexually transmitted infections, partner selection, use of condoms, avoidance of unintended pregnancy and contraceptive alternatives.  --Discussed safety belts, safety helmets, smoke  detector, gun safety, etc.  --Discussed sun protection with minimum of spf 30.        Follow-up: Return in about 1 year (around 4/19/2024) for Annual preventive visit.

## 2023-04-20 DIAGNOSIS — Z01.419 WELL WOMAN EXAM: ICD-10-CM

## 2023-04-20 LAB
C TRACH DNA GENITAL QL NAA+PROBE: NEGATIVE
CYTOLOGY REG CYTOL: NORMAL
N GONORRHOEA DNA GENITAL QL NAA+PROBE: NEGATIVE
SPECIMEN SOURCE: NORMAL

## 2023-05-08 ENCOUNTER — OFFICE VISIT (OUTPATIENT)
Dept: URGENT CARE | Facility: PHYSICIAN GROUP | Age: 23
End: 2023-05-08
Payer: COMMERCIAL

## 2023-05-08 VITALS
BODY MASS INDEX: 34.72 KG/M2 | RESPIRATION RATE: 18 BRPM | DIASTOLIC BLOOD PRESSURE: 62 MMHG | TEMPERATURE: 97 F | OXYGEN SATURATION: 96 % | HEART RATE: 73 BPM | HEIGHT: 66 IN | WEIGHT: 216 LBS | SYSTOLIC BLOOD PRESSURE: 124 MMHG

## 2023-05-08 DIAGNOSIS — H65.91 RIGHT SEROUS OTITIS MEDIA, UNSPECIFIED CHRONICITY: ICD-10-CM

## 2023-05-08 DIAGNOSIS — J02.9 SORE THROAT: ICD-10-CM

## 2023-05-08 LAB
HETEROPH AB SER QL LA: NEGATIVE
INT CON NEG: NORMAL
INT CON POS: NORMAL
POCT INT CON NEG: NEGATIVE
POCT INT CON POS: NEGATIVE
S PYO AG THROAT QL: NORMAL

## 2023-05-08 PROCEDURE — 99214 OFFICE O/P EST MOD 30 MIN: CPT | Performed by: PHYSICIAN ASSISTANT

## 2023-05-08 PROCEDURE — 87880 STREP A ASSAY W/OPTIC: CPT | Performed by: PHYSICIAN ASSISTANT

## 2023-05-08 PROCEDURE — 86308 HETEROPHILE ANTIBODY SCREEN: CPT | Performed by: PHYSICIAN ASSISTANT

## 2023-05-08 RX ORDER — AMOXICILLIN AND CLAVULANATE POTASSIUM 875; 125 MG/1; MG/1
1 TABLET, FILM COATED ORAL 2 TIMES DAILY
Qty: 14 TABLET | Refills: 0 | Status: SHIPPED | OUTPATIENT
Start: 2023-05-08 | End: 2023-05-15

## 2023-05-08 RX ORDER — DEXAMETHASONE SODIUM PHOSPHATE 10 MG/ML
10 INJECTION INTRAMUSCULAR; INTRAVENOUS ONCE
Status: COMPLETED | OUTPATIENT
Start: 2023-05-08 | End: 2023-05-08

## 2023-05-08 RX ADMIN — DEXAMETHASONE SODIUM PHOSPHATE 10 MG: 10 INJECTION INTRAMUSCULAR; INTRAVENOUS at 10:12

## 2023-05-08 ASSESSMENT — ENCOUNTER SYMPTOMS
CHILLS: 0
FEVER: 0
VOMITING: 0
NAUSEA: 0
COUGH: 0
SORE THROAT: 1

## 2023-05-08 ASSESSMENT — FIBROSIS 4 INDEX: FIB4 SCORE: 0.34

## 2023-05-08 NOTE — LETTER
[facility] [place]  [address] [address]     May 8, 2023    Patient: Daisy Fitzpatrick   YOB: 2000   Date of Visit: 5/8/2023       To Whom It May Concern:    Daisy Fitzpatrick was seen and treated in our department on 5/8/2023. Please excuse her from work today.    Sincerely,     Kurt Hay P.A.-C.

## 2023-05-08 NOTE — PROGRESS NOTES
Subjective:   Daisy Fitzpatrick is a 22 y.o. female who presents for Pharyngitis (Pt. Had a endoscopy March 30th since then has been experiencing, sore throat, Rt side neck pain, Rt. Ear pain, headaches.)        Patient presents with concerns of sore throat and right ear pain for the last month and a half.  States that she first noticed symptoms after undergoing an endoscopy, but they worsened approximately 1 week after the procedure.  She did not discuss her symptoms with her GI doctor.  Symptoms have been waxing and waning in severity but are typically always present.  She endorses pain with swallowing when she is not eating and drinking.  No pain during eating and drinking.  No difficulty swallowing.  Denies globus sensation.  She also endorses painful lymph nodes and points to her anterior cervical chain.  Endorses occasional headaches.  She denies pain at the TMJ, clicking, popping, catching at this joint.  Denies pain at this joint with eating and drinking.  No fevers, chills, nausea, vomiting.  She has not tried any medications for symptoms.      Review of Systems   Constitutional:  Negative for chills and fever.   HENT:  Positive for ear pain and sore throat. Negative for congestion.    Respiratory:  Negative for cough.    Gastrointestinal:  Negative for nausea and vomiting.     PMH:  has a past medical history of Gastroesophageal reflux disease without esophagitis (1/3/2023).    She has no past medical history of Addisons disease ([facility]), Adrenal disorder ([facility]), Allergy, Anemia, Anxiety, Arrhythmia, Arthritis, Asthma, Blood transfusion without reported diagnosis, Cancer ([facility]), Cataract, CHF (congestive heart failure) ([facility]), Clotting disorder ([facility]), COPD (chronic obstructive pulmonary disease) ([facility]), Cushings syndrome ([facility]), Depression, Diabetes ([facility]), Diabetic neuropathy ([facility]), Glaucoma, Goiter, Heart attack ([facility]), Heart murmur, HIV (human immunodeficiency virus infection) ([facility]), Hyperlipidemia,  "Hypertension, IBD (inflammatory bowel disease), Kidney disease, Meningitis, Migraine, Muscle disorder, Osteoporosis, Parathyroid disorder (HCC), Pituitary disease (HCC), Pulmonary emphysema (HCC), Seizure (HCC), Sickle cell disease (HCC), Stroke (HCC), Substance abuse (HCC), or Tuberculosis.  MEDS:   Current Outpatient Medications:     amoxicillin-clavulanate (AUGMENTIN) 875-125 MG Tab, Take 1 Tablet by mouth 2 times a day for 7 days., Disp: 14 Tablet, Rfl: 0    omeprazole (PRILOSEC) 20 MG delayed-release capsule, Take 1 Capsule by mouth 2 times a day., Disp: 60 Capsule, Rfl: 2    Norethindrone Acet-Ethinyl Est (MICROGESTIN 1/20 PO), Take  by mouth., Disp: , Rfl:     sucralfate (CARAFATE) 1 GM Tab, , Disp: , Rfl:   ALLERGIES: No Known Allergies  SURGHX: History reviewed. No pertinent surgical history.  SOCHX:  reports that she has never smoked. She has never been exposed to tobacco smoke. She has never used smokeless tobacco. She reports that she does not currently use alcohol. She reports that she does not use drugs.  FH: Family history was reviewed, no pertinent findings to report   Objective:   /62 (BP Location: Left arm, Patient Position: Sitting, BP Cuff Size: Adult)   Pulse 73   Temp 36.1 °C (97 °F) (Temporal)   Resp 18   Ht 1.676 m (5' 6\")   Wt 98 kg (216 lb)   LMP  (LMP Unknown)   SpO2 96%   BMI 34.86 kg/m²   Physical Exam  Vitals reviewed.   Constitutional:       General: She is not in acute distress.     Appearance: Normal appearance. She is well-developed. She is not toxic-appearing.   HENT:      Head: Normocephalic and atraumatic.      Right Ear: External ear normal. No mastoid tenderness.      Left Ear: Tympanic membrane, ear canal and external ear normal. No mastoid tenderness.      Ears:      Comments: Right TM is mildly erythematous and there is a serous effusion.  No movement of the right TM with Valsalva.     Nose: Nose normal. No congestion or rhinorrhea.      Mouth/Throat:      " Lips: Pink.      Mouth: Mucous membranes are moist.      Pharynx: Oropharynx is clear. Uvula midline. Posterior oropharyngeal erythema (Mild) present.      Tonsils: No tonsillar exudate. 2+ on the right. 2+ on the left.      Comments: Right tonsil is mildly edematous.  No exudate.  No bulging with soft palate.  No difficulty swallowing.    TMJ nontender to palpation bilaterally.  There is a slight shifting at the TMJ bilaterally when patient opens and closes her mouth.  She denies pain with this.  No crepitus.  Cardiovascular:      Rate and Rhythm: Normal rate and regular rhythm.      Heart sounds: Normal heart sounds, S1 normal and S2 normal.   Pulmonary:      Effort: Pulmonary effort is normal. No respiratory distress.      Breath sounds: Normal breath sounds. No stridor. No decreased breath sounds, wheezing, rhonchi or rales.   Lymphadenopathy:      Head:      Right side of head: No preauricular adenopathy.      Left side of head: No preauricular adenopathy.      Cervical: Cervical adenopathy (Tender to palpation but nonedematous.) present.      Right cervical: Superficial cervical adenopathy present. No posterior cervical adenopathy.     Left cervical: Superficial cervical adenopathy present. No posterior cervical adenopathy.   Skin:     General: Skin is dry.   Neurological:      Comments: Alert and oriented.    Psychiatric:         Speech: Speech normal.         Behavior: Behavior normal.         Results for orders placed or performed in visit on 05/08/23   POCT Rapid Strep A   Result Value Ref Range    Rapid Strep Screen NEG     Internal Control Positive Valid     Internal Control Negative Valid    POCT Mononucleosis (mono)   Result Value Ref Range    Heterophile Screen Negative Negative, Invalid    Internal Control Positive Negative     Internal Control Negative Negative        Assessment/Plan:   1. Right serous otitis media, unspecified chronicity  - amoxicillin-clavulanate (AUGMENTIN) 875-125 MG Tab; Take 1  Tablet by mouth 2 times a day for 7 days.  Dispense: 14 Tablet; Refill: 0    2. Sore throat  - POCT Rapid Strep A  - POCT Mononucleosis (mono)  - dexamethasone (DECADRON) injection (check route below) 10 mg    Testing for group A strep and mono are negative.  Patient does have right-sided serous otitis media on exam and right tonsil is mildly edematous.  Exam is otherwise unremarkable.    Recommend treating with antibiotic and single dose of Decadron.  If symptoms fail to improve within the next 72 hours or fail to fully resolve I recommend that she follow-up with her PCP for reevaluation and further management.  Additionally recommend reevaluation with any new or worsening symptoms.    If patient develops severe symptoms such as drooling/difficulty swallowing, difficulty opening their mouth, facial/neck redness or swelling, audible stridor or wheezing, difficulty moving their neck or other severe and concerning symptoms-I recommend that they go to the emergency room for further evaluation and management.

## 2023-05-12 ENCOUNTER — HOSPITAL ENCOUNTER (OUTPATIENT)
Dept: RADIOLOGY | Facility: MEDICAL CENTER | Age: 23
End: 2023-05-12
Payer: COMMERCIAL

## 2023-05-12 DIAGNOSIS — R10.13 EPIGASTRIC PAIN: ICD-10-CM

## 2023-05-12 PROCEDURE — 76700 US EXAM ABDOM COMPLETE: CPT

## 2023-07-25 ENCOUNTER — HOSPITAL ENCOUNTER (OUTPATIENT)
Dept: LAB | Facility: MEDICAL CENTER | Age: 23
End: 2023-07-25
Attending: STUDENT IN AN ORGANIZED HEALTH CARE EDUCATION/TRAINING PROGRAM
Payer: COMMERCIAL

## 2023-07-25 ENCOUNTER — HOSPITAL ENCOUNTER (OUTPATIENT)
Dept: LAB | Facility: MEDICAL CENTER | Age: 23
End: 2023-07-25
Payer: COMMERCIAL

## 2023-07-25 ENCOUNTER — HOSPITAL ENCOUNTER (OUTPATIENT)
Facility: MEDICAL CENTER | Age: 23
End: 2023-07-25
Payer: COMMERCIAL

## 2023-07-25 DIAGNOSIS — E66.9 OBESITY (BMI 30.0-34.9): ICD-10-CM

## 2023-07-25 PROCEDURE — 36415 COLL VENOUS BLD VENIPUNCTURE: CPT

## 2023-07-25 PROCEDURE — 84402 ASSAY OF FREE TESTOSTERONE: CPT

## 2023-07-25 PROCEDURE — 87045 FECES CULTURE AEROBIC BACT: CPT

## 2023-07-25 PROCEDURE — 83993 ASSAY FOR CALPROTECTIN FECAL: CPT

## 2023-07-25 PROCEDURE — 87328 CRYPTOSPORIDIUM AG IA: CPT

## 2023-07-25 PROCEDURE — 82705 FATS/LIPIDS FECES QUAL: CPT

## 2023-07-25 PROCEDURE — 87899 AGENT NOS ASSAY W/OPTIC: CPT

## 2023-07-25 PROCEDURE — 84270 ASSAY OF SEX HORMONE GLOBUL: CPT

## 2023-07-25 PROCEDURE — 84403 ASSAY OF TOTAL TESTOSTERONE: CPT

## 2023-07-25 PROCEDURE — 87077 CULTURE AEROBIC IDENTIFY: CPT

## 2023-07-25 PROCEDURE — 82653 EL-1 FECAL QUANTITATIVE: CPT

## 2023-07-25 PROCEDURE — 87329 GIARDIA AG IA: CPT

## 2023-07-26 LAB
G LAMBLIA+C PARVUM AG STL QL RAPID: NORMAL
SIGNIFICANT IND 70042: NORMAL
SITE SITE: NORMAL
SOURCE SOURCE: NORMAL

## 2023-07-27 LAB
E COLI SXT1+2 STL IA: NORMAL
FAT STL QL: NORMAL
NEUTRAL FAT STL QL: NORMAL
SIGNIFICANT IND 70042: NORMAL
SITE SITE: NORMAL
SOURCE SOURCE: NORMAL

## 2023-07-28 LAB
BACTERIA STL CULT: NORMAL
C JEJUNI+C COLI AG STL QL: NORMAL
E COLI SXT1+2 STL IA: NORMAL
SHBG SERPL-SCNC: 62 NMOL/L (ref 25–122)
SIGNIFICANT IND 70042: NORMAL
SITE SITE: NORMAL
SOURCE SOURCE: NORMAL
TESTOST FREE SERPL-MCNC: 5.1 PG/ML (ref 0.8–7.4)
TESTOST SERPL-MCNC: 45 NG/DL (ref 9–55)

## 2023-07-29 LAB
CALPROTECTIN STL-MCNT: 40 UG/G
ELASTASE PANC STL-MCNT: >800 UG/G

## 2023-10-09 ENCOUNTER — TELEMEDICINE (OUTPATIENT)
Dept: MEDICAL GROUP | Facility: PHYSICIAN GROUP | Age: 23
End: 2023-10-09
Payer: COMMERCIAL

## 2023-10-09 VITALS — OXYGEN SATURATION: 96 % | WEIGHT: 216 LBS | BODY MASS INDEX: 34.72 KG/M2 | HEIGHT: 66 IN

## 2023-10-09 DIAGNOSIS — R53.83 OTHER FATIGUE: ICD-10-CM

## 2023-10-09 DIAGNOSIS — M25.50 ARTHRALGIA, UNSPECIFIED JOINT: ICD-10-CM

## 2023-10-09 DIAGNOSIS — Z91.09 ENVIRONMENTAL ALLERGIES: ICD-10-CM

## 2023-10-09 DIAGNOSIS — Z82.61 FAMILY HISTORY OF RHEUMATOID ARTHRITIS: ICD-10-CM

## 2023-10-09 PROCEDURE — 99214 OFFICE O/P EST MOD 30 MIN: CPT | Mod: 95 | Performed by: STUDENT IN AN ORGANIZED HEALTH CARE EDUCATION/TRAINING PROGRAM

## 2023-10-09 ASSESSMENT — FIBROSIS 4 INDEX: FIB4 SCORE: 0.35

## 2023-10-09 NOTE — LETTER
[facility]  Hoda Wayne M.D.  [address] [place] [address]  Fax: [phone]   Authorization for Release/Disclosure of   Protected Health Information   Name: DAISY BARRY : 2000 SSN: [id number]   Address: [address] [place] [address] [address]  [place] [address] Phone:    [phone] (home)    I authorize the entity listed below to release/disclose the PHI below to:   [facility]/Hoda Wayne M.D. and Hoda Wayne M.D.   Provider or Entity Name:[place]     Address   City, State, Zip   Phone:      Fax:     Reason for request: continuity of care   Information to be released:    [  ] LAST COLONOSCOPY,  including any PATH REPORT and follow-up  [  ] LAST FIT/COLOGUARD RESULT [  ] LAST DEXA  [  ] LAST MAMMOGRAM  [  ] LAST PAP  [  ] LAST LABS [  ] RETINA EXAM REPORT  [  ] IMMUNIZATION RECORDS  [  ] Release all info      [  ] Check here and initial the line next to each item to release ALL health information INCLUDING  _____ Care and treatment for drug and / or alcohol abuse  _____ HIV testing, infection status, or AIDS  _____ Genetic Testing    DATES OF SERVICE OR TIME PERIOD TO BE DISCLOSED: _____________  I understand and acknowledge that:  * This Authorization may be revoked at any time by you in writing, except if your health information has already been used or disclosed.  * Your health information that will be used or disclosed as a result of you signing this authorization could be re-disclosed by the recipient. If this occurs, your re-disclosed health information may no longer be protected by State or Federal laws.  * You may refuse to sign this Authorization. Your refusal will not affect your ability to obtain treatment.  * This Authorization becomes effective upon signing and will  on (date) __________.      If no date is indicated, this Authorization will  one (1) year from the signature date.    Name: Daisy Barry  Signature: Date:   10/9/2023     PLEASE FAX  REQUESTED RECORDS BACK TO: [phone]

## 2023-10-09 NOTE — PROGRESS NOTES
"Virtual Visit: Established Patient   This visit was conducted via Zoom using secure and encrypted videoconferencing technology.   The patient was in their home in the [facility].    The patient's identity was confirmed and verbal consent was obtained for this virtual visit.    Subjective:   CC:   Chief Complaint   Patient presents with    Requesting Labs     Testing for molds.     Daisy Fitzpatrick is a 23 y.o. female presenting for evaluation and management of headaches, ear fullness, nasal congestion, enlarged lymph nodes in neck, fatigue, \"brain fog\", neck stiffness, joint pain in hands without swelling/redness/warmth for the past few months.  Patient was seen in urgent care May 2023 for otitis media with pharyngitis that was treated with antibiotics.  Patient reports she also went to [facility] ER 1 month ago for swollen lymph nodes and was treated with antibiotics for possible tooth infection but dentist reported no dental issues.  Patient also mentioned  recently developed hives and she has similar rash on arms and legs.  Patient thinks symptoms may be due to mold in the home.  Patient denies SOB or wheezing.  Patient reports symptoms are only present at home and she's asymptomatic at work.  Patient has tried Zyrtec which has not helped.  Patient reports Ibuprofen helps.  Family history of grandmother with rheumatoid arthritis.      ROS   Negative except as stated above.      Current medicines (including changes today)  Current Outpatient Medications   Medication Sig Dispense Refill    Norethindrone Acet-Ethinyl Est (MICROGESTIN 1/20 PO) Take  by mouth.      sucralfate (CARAFATE) 1 GM Tab  (Patient not taking: Reported on 5/8/2023)      omeprazole (PRILOSEC) 20 MG delayed-release capsule Take 1 Capsule by mouth 2 times a day. 60 Capsule 2     No current facility-administered medications for this visit.       Patient Active Problem List    Diagnosis Date Noted    H. pylori infection " "01/25/2023    Obesity (BMI 35.0-39.9 without comorbidity) 01/03/2023    Gastroesophageal reflux disease without esophagitis 01/03/2023    COVID-19 virus detected 07/22/2020        Objective:   Ht 1.676 m (5' 6\")   Wt 98 kg (216 lb)   SpO2 96%   BMI 34.86 kg/m²     Physical Exam:  Constitutional: Alert, no distress, well-groomed.  Skin: Well circumscribed, large, erythematous macules on arms.  Eye: Round. Conjunctiva clear, lids normal. No icterus.   ENMT: Lips pink without lesions, good dentition, moist mucous membranes. Phonation normal.  Neck: No masses, no thyromegaly. Moves freely without pain.  Respiratory: Unlabored respiratory effort, no cough or audible wheeze  Psych: Alert and oriented x3, normal affect and mood.     Assessment and Plan:   The following treatment plan was discussed:     1. Arthralgia, unspecified joint  2. Family history of rheumatoid arthritis  3. Other fatigue  Chronic, uncontrolled.  Patient complains fatigue, \"brain fog\", neck stiffness, and joint pan in hands.  Family history of grandmother with RA.  January 2023 CBC, CMP, TSH WNL.  Work-up for inflammatory arthritis ordered.  Continue ibuprofen as needed.  - Sed Rate; Future  - CRP QUANTITIVE (NON-CARDIAC); Future  - SHIRA W/REFLEX IF POSITIVE  - RHEUMATOID ARTHRITIS FACTOR; Future  - CCP    4. Environmental allergies  Chronic, uncontrolled.  Patient thinks symptoms may be due to mold in her home.  Patient denies asthma, SOB, wheezing.  Patient was advised to try OTC antihistamine daily, Flonase twice daily, and humidifier.  If no improvement at the next visit consider referral to Allergist.        I spent a total of 30 minutes with record review, exam, communication with the patient, communication with other providers, and documentation of this encounter.      Follow-up: Return in about 1 month (around 11/9/2023) for Discuss labs and concerns.         "

## 2023-12-06 ENCOUNTER — APPOINTMENT (OUTPATIENT)
Dept: MEDICAL GROUP | Facility: PHYSICIAN GROUP | Age: 23
End: 2023-12-06
Payer: COMMERCIAL

## 2024-02-22 ENCOUNTER — APPOINTMENT (OUTPATIENT)
Dept: MEDICAL GROUP | Facility: PHYSICIAN GROUP | Age: 24
End: 2024-02-22
Payer: COMMERCIAL

## 2024-03-04 ENCOUNTER — APPOINTMENT (OUTPATIENT)
Dept: MEDICAL GROUP | Facility: PHYSICIAN GROUP | Age: 24
End: 2024-03-04
Payer: COMMERCIAL

## 2024-03-11 ENCOUNTER — HOSPITAL ENCOUNTER (OUTPATIENT)
Dept: LAB | Facility: MEDICAL CENTER | Age: 24
End: 2024-03-11
Attending: STUDENT IN AN ORGANIZED HEALTH CARE EDUCATION/TRAINING PROGRAM
Payer: COMMERCIAL

## 2024-03-11 DIAGNOSIS — M25.50 ARTHRALGIA, UNSPECIFIED JOINT: ICD-10-CM

## 2024-03-11 DIAGNOSIS — Z82.61 FAMILY HISTORY OF RHEUMATOID ARTHRITIS: ICD-10-CM

## 2024-03-11 LAB
CRP SERPL HS-MCNC: 1.34 MG/DL (ref 0–0.75)
ERYTHROCYTE [SEDIMENTATION RATE] IN BLOOD BY WESTERGREN METHOD: 14 MM/HOUR (ref 0–25)
RHEUMATOID FACT SER IA-ACNC: <10 IU/ML (ref 0–14)

## 2024-03-11 PROCEDURE — 86431 RHEUMATOID FACTOR QUANT: CPT

## 2024-03-11 PROCEDURE — 36415 COLL VENOUS BLD VENIPUNCTURE: CPT

## 2024-03-11 PROCEDURE — 86038 ANTINUCLEAR ANTIBODIES: CPT

## 2024-03-11 PROCEDURE — 86235 NUCLEAR ANTIGEN ANTIBODY: CPT | Mod: 91

## 2024-03-11 PROCEDURE — 86225 DNA ANTIBODY NATIVE: CPT

## 2024-03-11 PROCEDURE — 86039 ANTINUCLEAR ANTIBODIES (ANA): CPT

## 2024-03-11 PROCEDURE — 86200 CCP ANTIBODY: CPT

## 2024-03-11 PROCEDURE — 85652 RBC SED RATE AUTOMATED: CPT

## 2024-03-11 PROCEDURE — 86140 C-REACTIVE PROTEIN: CPT

## 2024-03-12 LAB
CCP IGA+IGG SERPL IA-ACNC: 4 UNITS (ref 0–19)
NUCLEAR IGG SER QL IA: DETECTED

## 2024-03-13 ENCOUNTER — OFFICE VISIT (OUTPATIENT)
Dept: MEDICAL GROUP | Facility: PHYSICIAN GROUP | Age: 24
End: 2024-03-13
Payer: COMMERCIAL

## 2024-03-13 VITALS
HEART RATE: 78 BPM | OXYGEN SATURATION: 97 % | DIASTOLIC BLOOD PRESSURE: 70 MMHG | BODY MASS INDEX: 38.89 KG/M2 | SYSTOLIC BLOOD PRESSURE: 112 MMHG | TEMPERATURE: 98.6 F | WEIGHT: 242 LBS | HEIGHT: 66 IN

## 2024-03-13 DIAGNOSIS — R22.1 MASS OF RIGHT SIDE OF NECK: ICD-10-CM

## 2024-03-13 DIAGNOSIS — R76.8 POSITIVE ANA (ANTINUCLEAR ANTIBODY): ICD-10-CM

## 2024-03-13 LAB
ANA INTERPRETIVE COMMENT Q5143: ABNORMAL
ANA PATTERN Q5144: ABNORMAL
ANA TITER Q5145: ABNORMAL
ANTINUCLEAR ANTIBODY (ANA), HEP-2, IGG Q5142: DETECTED

## 2024-03-13 PROCEDURE — 3074F SYST BP LT 130 MM HG: CPT | Performed by: STUDENT IN AN ORGANIZED HEALTH CARE EDUCATION/TRAINING PROGRAM

## 2024-03-13 PROCEDURE — 3078F DIAST BP <80 MM HG: CPT | Performed by: STUDENT IN AN ORGANIZED HEALTH CARE EDUCATION/TRAINING PROGRAM

## 2024-03-13 PROCEDURE — 99214 OFFICE O/P EST MOD 30 MIN: CPT | Performed by: STUDENT IN AN ORGANIZED HEALTH CARE EDUCATION/TRAINING PROGRAM

## 2024-03-13 ASSESSMENT — PATIENT HEALTH QUESTIONNAIRE - PHQ9: CLINICAL INTERPRETATION OF PHQ2 SCORE: 0

## 2024-03-13 ASSESSMENT — FIBROSIS 4 INDEX: FIB4 SCORE: 0.35

## 2024-03-13 NOTE — PROGRESS NOTES
"Subjective:     Chief Complaint   Patient presents with    Lump     R SIDE tender to the touch.          HPI:   Daisy presents today for lump below right jaw.  Patient reports onset in October and then symptoms resolved but returned earlier this year.  Patient had a televisit in October and at that time she complained of enlarged lymph nodes in neck, fatigue, \"brain fog\", neck stiffness, joint pain in hands without swelling/redness/warmth for the past few months.  Patient reports neck pain resolved after seeing chiropractor and joint pain in hands is also improving.  Patient went to [facility] ER when this first started for the swollen lymph nodes and was treated with antibiotics for possible tooth infection but dentist reported no dental issues.  Patient reports lump under jaw is not painful but she feels a liquid draining with associated warmth and burning sensation.    Health Maintenance: Completed    ROS:  Negative except as stated above.      Objective:     Exam:  /70   Pulse 78   Temp 37 °C (98.6 °F) (Temporal)   Ht 1.676 m (5' 6\")   Wt 110 kg (242 lb)   SpO2 97%   BMI 39.06 kg/m²  Body mass index is 39.06 kg/m².    Physical Exam    Gen: Alert and oriented, no acute distress.  Lungs: Normal effort, CTAB, no wheezing / rhonchi / rales.  CV: RRR, normal S1 and S2, no murmurs.      Labs:   Hospital Outpatient Visit on 03/11/2024   Component Date Value Ref Range Status    Rheumatoid Factor -Neph- 03/11/2024 <10  0 - 14 IU/mL Final    Stat C-Reactive Protein 03/11/2024 1.34 (H)  0.00 - 0.75 mg/dL Final    Sed Rate Westergren 03/11/2024 14  0 - 25 mm/hour Final    Antinuclear Antibody 03/11/2024 Detected (A)  None Detected Final    Comment: Antibodies to Anti-Nuclear Antibodies (SHIRA) detected. Additional  testing to follow.  INTERPRETIVE INFORMATION: Anti-Nuclear Antibodies (SHIRA), IgG by  KIKO  Antinuclear Antibodies (SHIRA), IgG by KIKO: SHIRA specimens are  screened using enzyme-linked " immunosorbent assay (KIKO)  methodology. All KIKO results reported as Detected are further  tested by indirect fluorescent assay (IFA) using HEp-2 substrate  with an IgG-specific conjugate. The SHIRA KIKO screen is designed  to detect antibodies against dsDNA, histones, SS-A (Ro), SS-B  (La), Adame, Adame/RNP, Scl-70, Disha-1, centromeric proteins, other  antigens extracted from the HEp-2 cell nucleus. SHIRA KIKO assays  have been reported to have lower sensitivities than SHIRA IFA for  systemic autoimmune rheumatic diseases (SARD).  Negative results do not necessarily rule out SARD.  Performed By: [place] [address]  : Ben Cabral MD, PhD  CLIA Number: [id number]      Cyclic Citrullinated Peptide Ab, I* 03/11/2024 4  0 - 19 Units Final    Comment: INTERPRETIVE INFORMATION: Cyclic Citrullinated Peptide Ab, IgG/A  19 Units or less ................... Negative  20-39 Units ........................ Weak Positive  40-59 Units ........................ Moderate Positive  60 Units or greater ................ Strong Positive  A positive result for cyclic citrullinated peptide (CCP)  antibodies in conjunction with consistent clinical features may be  suggestive of rheumatoid arthritis (RA). Anti-CCP, IgG/IgA  antibodies are present in about 66-74 percent of RA patients and  have specificities of 96-99 percent. Detection of IgA antibodies  in addition to the usual IgG antibodies enhances the sensitivity  due to some RA patients having IgA antibodies to CCP in the  absence of IgG. These autoantibodies may be present in the  preclinical phase of disease, are associated with future RA  development, and may predict radiographic joint destruction.  Patients with weak positive results should be monitored and  testing repeated.  Performed By: [facility]  [address] [place] [address]  : Ben Cabarl,  MD, PhD  [facility] Number: [id number]           Assessment & Plan:     23 y.o. female with the following -     1. Mass of right side of neck  Chronic.  Onset in October and then resolved but returned a couple months ago.  Patient saw dentist and there were no issues.  Ultrasound ordered for further evaluation.  - US-SOFT TISSUES OF HEAD - NECK; Future    2. Positive SHIRA (antinuclear antibody)  This is a new problem.  SHIRA positive and reflex pending.  CRP was also positive.  RF, anti-CCP, ESR were negative.  Patient currently denies any joint pain.  Awaiting SHIRA reflex and order additional work-up.        I spent a total of 30 minutes with record review, exam, communication with the patient, communication with other providers, and documentation of this encounter.      Return in about 1 month (around 4/13/2024) for Discuss imaging, Discuss labs.    Please note that this dictation was created using voice recognition software. I have made every reasonable attempt to correct obvious errors, but I expect that there are errors of grammar and possibly content that I did not discover before finalizing the note.

## 2024-03-14 LAB
DSDNA AB TITR SER CLIF: NORMAL {TITER}
ENA JO1 AB TITR SER: 1 AU/ML (ref 0–40)
ENA SCL70 IGG SER QL: 2 AU/ML (ref 0–40)
ENA SM IGG SER-ACNC: 1 AU/ML (ref 0–40)
ENA SS-B IGG SER IA-ACNC: 1 AU/ML (ref 0–40)
SSA52 R0ENA AB IGG Q0420: 2 AU/ML (ref 0–40)
SSA60 R0ENA AB IGG Q0419: 0 AU/ML (ref 0–40)
U1 SNRNP IGG SER QL: 2 UNITS (ref 0–19)

## 2024-03-28 ENCOUNTER — APPOINTMENT (OUTPATIENT)
Dept: RADIOLOGY | Facility: MEDICAL CENTER | Age: 24
End: 2024-03-28
Attending: STUDENT IN AN ORGANIZED HEALTH CARE EDUCATION/TRAINING PROGRAM
Payer: COMMERCIAL

## 2024-04-17 ENCOUNTER — APPOINTMENT (OUTPATIENT)
Dept: RADIOLOGY | Facility: MEDICAL CENTER | Age: 24
End: 2024-04-17
Attending: STUDENT IN AN ORGANIZED HEALTH CARE EDUCATION/TRAINING PROGRAM
Payer: COMMERCIAL

## 2024-04-17 DIAGNOSIS — R22.1 MASS OF RIGHT SIDE OF NECK: ICD-10-CM

## 2024-04-17 PROCEDURE — 76536 US EXAM OF HEAD AND NECK: CPT

## 2024-04-29 ENCOUNTER — OFFICE VISIT (OUTPATIENT)
Dept: MEDICAL GROUP | Facility: PHYSICIAN GROUP | Age: 24
End: 2024-04-29
Payer: COMMERCIAL

## 2024-04-29 VITALS
TEMPERATURE: 98.5 F | OXYGEN SATURATION: 96 % | SYSTOLIC BLOOD PRESSURE: 120 MMHG | HEIGHT: 66 IN | DIASTOLIC BLOOD PRESSURE: 78 MMHG | WEIGHT: 242.8 LBS | HEART RATE: 70 BPM | BODY MASS INDEX: 39.02 KG/M2

## 2024-04-29 DIAGNOSIS — M43.6 NECK STIFFNESS: ICD-10-CM

## 2024-04-29 DIAGNOSIS — R76.8 POSITIVE ANA (ANTINUCLEAR ANTIBODY): ICD-10-CM

## 2024-04-29 DIAGNOSIS — E66.9 OBESITY (BMI 35.0-39.9 WITHOUT COMORBIDITY): ICD-10-CM

## 2024-04-29 DIAGNOSIS — R53.83 OTHER FATIGUE: ICD-10-CM

## 2024-04-29 DIAGNOSIS — Z82.61 FAMILY HISTORY OF RHEUMATOID ARTHRITIS: ICD-10-CM

## 2024-04-29 DIAGNOSIS — R22.1 MASS OF RIGHT SIDE OF NECK: ICD-10-CM

## 2024-04-29 DIAGNOSIS — R79.82 ELEVATED C-REACTIVE PROTEIN (CRP): ICD-10-CM

## 2024-04-29 DIAGNOSIS — M25.542 JOINT PAIN IN BOTH HANDS: ICD-10-CM

## 2024-04-29 DIAGNOSIS — M25.541 JOINT PAIN IN BOTH HANDS: ICD-10-CM

## 2024-04-29 ASSESSMENT — FIBROSIS 4 INDEX: FIB4 SCORE: 0.35

## 2024-04-29 NOTE — ASSESSMENT & PLAN NOTE
Patient reports onset in October and then resolved but returned earlier this year.  Patient saw dentist and there were no issues.  April 2024 US showed no mass or adenopathy to correspond with palpable lesion in the RIGHT submandibular region and prominent ducts adjacent the submandibular gland of uncertain etiology.  Patient still complains of associated discomfort especially after eating and reports the area feels tense.

## 2024-04-29 NOTE — PROGRESS NOTES
"Subjective:     Chief Complaint   Patient presents with    Follow-Up       HPI:   Daisy presents today with    Mass of right side of neck  Patient reports onset in October and then resolved but returned earlier this year.  Patient saw dentist and there were no issues.  April 2024 US showed no mass or adenopathy to correspond with palpable lesion in the RIGHT submandibular region and prominent ducts adjacent the submandibular gland of uncertain etiology.  Patient still complains of associated discomfort especially after eating and reports the area feels tense.    Health Maintenance: Completed    ROS:  Constitutional:  Positive for fatigue but negative for chills, fever, weight loss.  HEENT:  Negative for blurred vision, hearing loss, sore throat.  Respiratory:  Negative for cough, sputum production and shortness of breath.  Cardiovascular:  Negative for chest pain, palpitations and leg swelling.  Gastrointestinal:  Negative for abdominal pain, blood in stool, constipation, diarrhea and vomiting.  Musculoskeletal:  Positive for joint pain in hands and neck stiffness but negative for back pain and falls.  Skin:  Negative for rash.   Neurological:  Negative for dizziness, seizures, weakness and headaches.  Endo/Heme/Allergies:  Does not bruise/bleed easily.  Psychiatric/Behavioral:  Negative for depression, anxiety and suicidal thoughts.        Objective:     Exam:  /78   Pulse 70   Temp 36.9 °C (98.5 °F) (Temporal)   Ht 1.676 m (5' 6\")   Wt 110 kg (242 lb 12.8 oz)   SpO2 96%   BMI 39.19 kg/m²  Body mass index is 39.19 kg/m².    Physical Exam    Gen: Alert and oriented, no acute distress.  Lungs: Normal effort, CTAB, no wheezing / rhonchi / rales.  CV: RRR, normal S1 and S2, no murmurs.      Labs:   Hospital Outpatient Visit on 03/11/2024   Component Date Value Ref Range Status    Rheumatoid Factor -Neph- 03/11/2024 <10  0 - 14 IU/mL Final    Stat C-Reactive Protein 03/11/2024 1.34 (H)  0.00 - 0.75 mg/dL " Final    Sed Rate Marissa 03/11/2024 14  0 - 25 mm/hour Final    Antinuclear Antibody 03/11/2024 Detected (A)  None Detected Final    Comment: Antibodies to Anti-Nuclear Antibodies (SHIRA) detected. Additional  testing to follow.  INTERPRETIVE INFORMATION: Anti-Nuclear Antibodies (SHIRA), IgG by  KIKO  Antinuclear Antibodies (SHIRA), IgG by KIKO: SHIRA specimens are  screened using enzyme-linked immunosorbent assay (KIKO)  methodology. All KIKO results reported as Detected are further  tested by indirect fluorescent assay (IFA) using HEp-2 substrate  with an IgG-specific conjugate. The SHIRA KIKO screen is designed  to detect antibodies against dsDNA, histones, SS-A (Ro), SS-B  (La), Adame, Adame/RNP, Scl-70, Disha-1, centromeric proteins, other  antigens extracted from the HEp-2 cell nucleus. SHIRA KIKO assays  have been reported to have lower sensitivities than SHIRA IFA for  systemic autoimmune rheumatic diseases (SARD).  Negative results do not necessarily rule out SARD.  Performed By: [facility]  [address] [place] [id number]  : Ben Cabral MD, PhD  CLIA Number: [id number]      Cyclic Citrullinated Peptide Ab, I* 03/11/2024 4  0 - 19 Units Final    Comment: INTERPRETIVE INFORMATION: Cyclic Citrullinated Peptide Ab, IgG/A  19 Units or less ................... Negative  20-39 Units ........................ Weak Positive  40-59 Units ........................ Moderate Positive  60 Units or greater ................ Strong Positive  A positive result for cyclic citrullinated peptide (CCP)  antibodies in conjunction with consistent clinical features may be  suggestive of rheumatoid arthritis (RA). Anti-CCP, IgG/IgA  antibodies are present in about 66-74 percent of RA patients and  have specificities of 96-99 percent. Detection of IgA antibodies  in addition to the usual IgG antibodies enhances the sensitivity  due to some RA patients having IgA antibodies to CCP in the  absence of  IgG. These autoantibodies may be present in the  preclinical phase of disease, are associated with future RA  development, and may predict radiographic joint destruction.  Patients with weak positive results should be monitored and  testing repeated.  Performed By: [facility]  [address] [place] [address]  : Ben Cabral MD, PhD  CLIA Number: [id number]      Antinuclear Antibody (SHIRA), HEp-2,* 03/11/2024 Detected (H)  <1:80 Final    SHIRA Interpretive Comment 03/11/2024 See Note   Final    Comment: Speckled Pattern  Clinical associations: SLE, SSc, SjS, DM, PM, MCTD, UCTD. May also  be found in healthy individuals  Main autoantibodies: Anti-SSA-52 (Ro52), anti-SSA-60 (Ro60),  anti-SS-B/LA, anti-Cristobal-1 (anti-Scl-70), Smith, anti-U1-RNP,  anti-U2-RNP, anti-Mi-2, anti-p155/140 (TIF1g), anti-Ku, anti-RNA  polymerase, anti-DFS70/LEDGF-P75  List of Abbreviations  Antisynthetase syndrome (ARS), chronic active hepatitis (CAH),  inflammatory  myopathies (IM) [dermatomyositis (DM), polymyositis  (PM), necrotizing autoimmune myopathy (NAM)], interstitial lung  disease (ILD), juvenile idiopathic arthritis (TENA), mixed  connective tissue disease (MCTD), primary biliary cholangitis  (PBC), rheumatoid arthritis (RA), systemic autoimmune rheumatic  diseases (SARD), Sjogren syndrome (SjS), systemic lupus  erythematosus (SLE), systemic sclerosis (SSc), undifferentiated  connective tissue disease (UCTD).  INTERPRETIVE INFORMATION: SHIRA Interpretive Comment  Presence of antinuclear antibodies (SHIRA) i                           s a hallmark feature of  systemic autoimmune rheumatic diseases (SARD). However, SHIRA lacks  diagnostic specificity and is associated with a variety of  diseases (cancers, autoimmune, infectious, and inflammatory  conditions)  and may also occur in healthy individuals in varying  prevalence. The lack of diagnostic specificity  requires  confirmation of positive SHIRA by more specific serologic tests. SHIRA  (nuclear reactivity) positive patterns reported include  centromere, homogeneous, nuclear dots, nucleolar, or speckled. SHIRA  (cytoplasmic reactivity) positive patterns reported include  reticular/AMA, discrete/GW body-like, polar/golgi-like,  cytoplasmic speckled or rods and rings. All positive patterns are  reported to endpoint titers (1:2560). Reported patterns may help  guide differential diagnosis, although they may not be specific  for individual antibodies or diseases. Mitotic staining patterns  not reported.  Negative results do not necessarily rule out SARD.  Performed By: [facility]  [address] [place] [address]  : Ben Cabral MD, PhD  CLIA Number: [id number]      SHIRA Pattern 03/11/2024 Speckled (A)   Final    SHIRA Titer 03/11/2024 1:320 (A)   Final    Comment: Extractable Nuclear Antigen Antibodies (RNP, Adame, SSA 52, SSA  60, Scleroderma, Disha-1, and SSB), and Double Stranded DNA (dsDNA)  Antibody IgG to follow.  Performed By: [facility]  [address] [place] [address]  : Ben Cabral MD, PhD  CLIA Number: [id number]      Anti-Dna -Ds 03/11/2024 SEE BELOW   Final    Comment: Double-Stranded DNA (dsDNA  SEE BELOW  4                                           0-24           IU  INTERPRETIVE INFORMATION: Double-Stranded DNA (dsDNA)  Ab IgG KIKO  24 IU or less........Negative  25-30 IU.............Borderline Positive  30-60 IU.............Low Positive   IU............Positive  201 IU or greater....Strong Positive  Positivity for anti-double stranded DNA (anti-dsDNA) IgG antibody  is a diagnostic criterion of systemic lupus erythematosus (SLE).  Specimens are initially screened by enzyme-linked immunosorbent  assay (KIKO). If ordered as reflex (4205066), positive KIKO  results (>24 IU) will be reflexed to a  highly specific IFA titer  (Crithidia luciliae indirect fluorescent test [JAMES]) for  confirmation. Some patients with early or inactive SLE may be  positive for anti-dsDNA IgG by KIKO but negative by JAMES. If the  patient is negative by JAMES but positive by KIKO and clinical  suspicion remains, consider antinuclear antibody (SHIRA) testing by  IF                           A. Additional information and recommendations for testing may be  found at  [email or web address].  Performed By: [facility]  [address] [place] [address]  : Ben Cabral MD, PhD  CLIA Number: [id number]      Disha-1 Antibody, IgG 03/11/2024 1  0 - 40 AU/mL Final    Comment: INTERPRETIVE INFORMATION:  Disha-1 Antibody, IgG  29 AU/mL or less.........Negative  30-40 AU/mL..............Equivocal  41 AU/mL or greater......Positive  Presence of Disha-1 (antihistidyl transfer RNA [t-RNA] synthetase)  antibody is associated with polymyositis and may also be seen in  patients with dermatomyositis. Disha-1 antibody is associated with  pulmonary involvement (interstitial lung disease), Raynaud  phenomenon, arthritis, and 's hands (implicated in  antisynthetase syndrome).  Performed By: [facility]  [address] [place] [address]  : Ben Cabral MD, PhD  CLIA Number: [id number]      Anti-Scl-70 03/11/2024 2  0 - 40 AU/mL Final    Comment: INTERPRETIVE INFORMATION: Scleroderma (Scl-70) (SHREYA) Ab, IgG  29 AU/mL or Less ............. Negative  30 - 40 AU/mL ................ Equivocal  41 AU/mL or Greater .......... Positive  The presence of Scl-70 antibodies (also referred to as  topoisomerase I, reza-I or JENNYFER) is considered diagnostic for  systemic sclerosis (SSc). Scl-70 antibodies alone are detected in  about 20 percent of SSc patients and are associated with the  diffuse form of the disease, which may include specific  organ  involvement and poor prognosis. Scl-70 antibodies have also been  reported in a varying percentage of patients with systemic lupus  erythematosus (SLE). Scl-70 (reza-1) is a DNA binding protein and  anti-DNA/DNA complexes in the sera of SLE patients may bind to  reza-I, leading to a false-positive result. The presence of Scl-70  antibody in sera may also be due to contamination of recombinant  Scl-70 with DNA derived from cellular material used in  immunoassays. Strong clinical correlation is recomm                           ended if both  Scl-70 and dsDNA antibodies are detected.  Negative results do not necessarily rule out the presence of SSc.  If clinical suspicion remains, consider further testing for  centromere, RNA polymerase III and U3-RNP, PM/Scl, or Th/To  antibodies.  Performed By: [facility]  [address] [place] [address]  : Ben Cabral MD, PhD  CLIA Number: [id number]      Adame Antibodies 03/11/2024 1  0 - 40 AU/mL Final    Comment: INTERPRETIVE INFORMATION: Adame (SHREYA) Antibody, IgG  29 AU/mL or Less ............. Negative  30 - 40 AU/mL ................ Equivocal  41 AU/mL or Greater .......... Positive  Adame antibody is highly specific (greater than 90 percent) for  systemic lupus erythematosus (SLE) but only occurs in 30-35  percent of SLE cases. The presence of antibodies to Adame has  variable associations with SLE clinical manifestations.  Performed By: [facility]  [address] [place] [address]  : Ben Cabral MD, PhD  CLIA Number: [id number]      Sjogren'S Anti-Ss-B 03/11/2024 1  0 - 40 AU/mL Final    Comment: INTERPRETIVE INFORMATION: SSB (La) (SHRYEA) Ab, IgG  29 AU/mL or Less ............. Negative  30 - 40 AU/mL ................ Equivocal  41 AU/mL or Greater .......... Positive  SSB (La) antibody is seen in 50-60% of Sjogren syndrome cases and  is specific if it is the only SHREYA antibody  present. 15-25% of  patients with systemic lupus erythematosus (SLE) and 5-10% of  patients with progressive systemic sclerosis (PSS) also have this  antibody.  Performed By: [facility]  [address] [place] [address]  : Ben Cabral MD, PhD  CLIA Number: [id number]      SSA 52 (R0)(SHREYA) Ab, IgG 03/11/2024 2  0 - 40 AU/mL Final    Comment: INTERPRETIVE INFORMATION: SSA-52 (Ro52) (SHREYA) Antibody, IgG  29 AU/mL or Less ............. Negative  30 - 40 AU/mL ................ Equivocal  41 AU/mL or Greater .......... Positive  SSA-52 (Ro52) and/or SSA-60 (Ro60) antibodies are associated with  a diagnosis of Sjogren syndrome, systemic lupus erythematosus  (SLE), and systemic sclerosis. SSA-52 antibody overlaps  significantly with the major SSc-related antibodies. SSA-52 (Ro52)  antibody occurs frequently in patients with inflammatory  myopathies, often in the presence of interstitial lung disease.      SSA 60 (R0)(SHREYA) Ab, IgG 03/11/2024 0  0 - 40 AU/mL Final    Comment: REFERENCE INTERVAL: SSA-60 (Ro60) (SHREYA) Antibody, IgG  29 AU/mL or Less ............. Negative  30 - 40 AU/mL ................ Equivocal  41 AU/mL or Greater .......... Positive  Performed By: [facility]  [address] [place] [address]  : Ben Cabral MD, PhD  CLIA Number: [id number]      Adame/RNP IgG (SHREYA) 03/11/2024 2  0 - 19 Units Final    Comment: INTERPRETIVE INFORMATION: Adame/RNP (SHREYA) Antibody, IgG  19 Units or Less ............. Negative  20 to 39 Units ............... Weak Positive  40 to 80 Units ............... Moderate Positive  81 Units or greater .......... Strong Positive  Adame/RNP antibodies are frequently seen in patients with mixed  connective tissue disease (MCTD) and are also associated with  other systemic autoimmune rheumatic diseases (SARDs) such as  systemic lupus erythematosus (SLE), systemic sclerosis, and  myositis. Antibodies targeting the  Adame/RNP antigenic complex  also recognize Adame antigens, therefore, the Adame antibody  response must be considered when interpreting these results.  Performed By: [facility]  [address] [place] [address]  : Ben Cabral MD, PhD  CLIA Number: [id number]           Assessment & Plan:     23 y.o. female with the following -     1. Mass of right side of neck  Chronic, uncontrolled.  US showed prominent ducts adjacent to the right submandibular gland of uncertain etiology.  CT ordered for further evaluation and given referral to ENT.  - CT-SOFT TISSUE NECK W/O; Future  - Referral to ENT    2. Positive SHIRA (antinuclear antibody)  3. Elevated C-reactive protein (CRP)  4. Other fatigue  5. Neck stiffness  6. Joint pain in both hands  7. Family history of rheumatoid arthritis  Chronic, uncontrolled.  CBC, CMP, TSH, ESR WNL.  CRP 1.34 and SHIRA 1:320 but further work-up was negative.  Continue NSAIDs as needed.  Given referral to Rheumatology for further evaluation.  - Referral to Rheumatology    8. Obesity (BMI 35.0-39.9 without comorbidity)  - CBC WITHOUT DIFFERENTIAL; Future  - Comp Metabolic Panel; Future  - Lipid Profile; Future      Return in about 1 month (around 5/29/2024) for Annual preventive visit, Discuss labs.    Please note that this dictation was created using voice recognition software. I have made every reasonable attempt to correct obvious errors, but I expect that there are errors of grammar and possibly content that I did not discover before finalizing the note.

## 2024-08-26 ENCOUNTER — APPOINTMENT (OUTPATIENT)
Dept: MEDICAL GROUP | Facility: PHYSICIAN GROUP | Age: 24
End: 2024-08-26
Payer: COMMERCIAL

## 2025-02-08 ENCOUNTER — OFFICE VISIT (OUTPATIENT)
Dept: URGENT CARE | Facility: PHYSICIAN GROUP | Age: 25
End: 2025-02-08
Payer: COMMERCIAL

## 2025-02-08 ENCOUNTER — HOSPITAL ENCOUNTER (OUTPATIENT)
Facility: MEDICAL CENTER | Age: 25
End: 2025-02-08
Payer: COMMERCIAL

## 2025-02-08 VITALS
HEART RATE: 71 BPM | BODY MASS INDEX: 37.83 KG/M2 | WEIGHT: 255.4 LBS | HEIGHT: 69 IN | DIASTOLIC BLOOD PRESSURE: 84 MMHG | TEMPERATURE: 98.1 F | SYSTOLIC BLOOD PRESSURE: 126 MMHG | RESPIRATION RATE: 16 BRPM | OXYGEN SATURATION: 97 %

## 2025-02-08 DIAGNOSIS — R05.1 ACUTE COUGH: ICD-10-CM

## 2025-02-08 DIAGNOSIS — J04.0 LARYNGITIS: ICD-10-CM

## 2025-02-08 DIAGNOSIS — J02.9 EXUDATIVE PHARYNGITIS: ICD-10-CM

## 2025-02-08 DIAGNOSIS — R53.83 OTHER FATIGUE: ICD-10-CM

## 2025-02-08 LAB
HETEROPH AB SER QL LA: NEGATIVE
POCT INT CON NEG: NEGATIVE
POCT INT CON POS: POSITIVE
S PYO DNA SPEC NAA+PROBE: NOT DETECTED

## 2025-02-08 PROCEDURE — 3074F SYST BP LT 130 MM HG: CPT

## 2025-02-08 PROCEDURE — 87070 CULTURE OTHR SPECIMN AEROBIC: CPT

## 2025-02-08 PROCEDURE — 3079F DIAST BP 80-89 MM HG: CPT

## 2025-02-08 PROCEDURE — 87651 STREP A DNA AMP PROBE: CPT

## 2025-02-08 PROCEDURE — 86308 HETEROPHILE ANTIBODY SCREEN: CPT

## 2025-02-08 PROCEDURE — 99213 OFFICE O/P EST LOW 20 MIN: CPT

## 2025-02-08 RX ORDER — METHYLPREDNISOLONE 4 MG/1
TABLET ORAL
Qty: 21 TABLET | Refills: 0 | Status: SHIPPED | OUTPATIENT
Start: 2025-02-08

## 2025-02-08 RX ORDER — LIDOCAINE HYDROCHLORIDE 20 MG/ML
SOLUTION OROPHARYNGEAL
Qty: 100 ML | Refills: 0 | Status: SHIPPED | OUTPATIENT
Start: 2025-02-08

## 2025-02-08 ASSESSMENT — ENCOUNTER SYMPTOMS
NAUSEA: 0
WHEEZING: 0
SHORTNESS OF BREATH: 0
BACK PAIN: 0
EYE DISCHARGE: 0
FEVER: 0
SINUS PAIN: 0
STRIDOR: 0
VOMITING: 0
CHILLS: 0
HEMOPTYSIS: 0
COUGH: 1
MYALGIAS: 0
SPUTUM PRODUCTION: 0
SORE THROAT: 1

## 2025-02-08 NOTE — PROGRESS NOTES
Subjective:   Daisy Fitzpatrick is a 24 y.o. female who presents for Sore Throat (Body aches, regular cold Sx x 2 weeks ago/Sore throat and loss of voice x 1.5 weeks ago still lingering)      Patient presents with complaints of sore throat, loss of voice, intermittent dry cough, fatigue, and congestion for the last 2 weeks.  Patient states initially had flulike symptoms of cold cough fever and congestion about 2 weeks ago, approximately 5 days after this started she started to have sore throat.  Patient states she took leftover amoxicillin 500 mg every 8 for 1 week.  States that therapy ended approximately 4 days ago, states that she seemed to be getting better and that most of her symptoms were improving, though her sore throat has been persistent.  She denies any stridor, wheezing, shortness of breath or chest pain.  No other sick contacts as far she is aware of        Review of Systems   Constitutional:  Negative for chills and fever.   HENT:  Positive for congestion and sore throat. Negative for ear discharge, ear pain and sinus pain.    Eyes:  Negative for discharge.   Respiratory:  Positive for cough. Negative for hemoptysis, sputum production, shortness of breath, wheezing and stridor.    Cardiovascular:  Negative for chest pain.   Gastrointestinal:  Negative for nausea and vomiting.   Genitourinary: Negative.    Musculoskeletal:  Negative for back pain and myalgias.   Skin:  Negative for rash.     Refer to HPI for additional details.    During this visit, appropriate PPE was worn, and hand hygiene was performed.    PMH:  has a past medical history of Environmental allergies (10/9/2023) and Gastroesophageal reflux disease without esophagitis (1/3/2023).    She has no past medical history of Addisons disease ([facility]), Adrenal disorder ([facility]), Anemia, Anxiety, Arrhythmia, Arthritis, Asthma, Blood transfusion without reported diagnosis, Cancer ([facility]), Cataract, CHF (congestive heart failure) ([facility]), Clotting  "disorder ([facility]), COPD (chronic obstructive pulmonary disease) (HCC), Cushings syndrome (HCC), Depression, Diabetes (HCC), Diabetic neuropathy ([facility]), Glaucoma, Goiter, Heart attack (HCC), Heart murmur, HIV (human immunodeficiency virus infection) ([facility]), Hyperlipidemia, Hypertension, IBD (inflammatory bowel disease), Kidney disease, Meningitis, Migraine, Muscle disorder, Osteoporosis, Parathyroid disorder ([facility]), Pituitary disease (HCC), Pulmonary emphysema (HCC), Seizure ([facility]), Sickle cell disease (HCC), Stroke ([facility]), Substance abuse ([facility]), or Tuberculosis.    MEDS:   Current Outpatient Medications:     methylPREDNISolone (MEDROL DOSEPAK) 4 MG Tablet Therapy Pack, Follow schedule on package instructions., Disp: 21 Tablet, Rfl: 0    lidocaine (XYLOCAINE) 2 % Solution, 5mL orally, may be applied as a swish and spit up to three times daily as needed for throat pain, Disp: 100 mL, Rfl: 0    Norethindrone Acet-Ethinyl Est (MICROGESTIN 1/20 PO), Take  by mouth., Disp: , Rfl:     ALLERGIES: No Known Allergies  SURGHX: History reviewed. No pertinent surgical history.  SOCHX:  reports that she has never smoked. She has never been exposed to tobacco smoke. She has never used smokeless tobacco. She reports that she does not currently use alcohol. She reports that she does not use drugs.    FH: Per HPI as applicable/pertinent.    Medications, Allergies, and current problem list reviewed today in Epic.     Objective:     /84 (BP Location: Right arm, Patient Position: Sitting, BP Cuff Size: Adult)   Pulse 71   Temp 36.7 °C (98.1 °F) (Temporal)   Resp 16   Ht 1.74 m (5' 8.5\")   Wt 116 kg (255 lb 6.4 oz)   SpO2 97%     Physical Exam  Vitals reviewed.   Constitutional:       General: She is not in acute distress.     Appearance: She is normal weight. She is not ill-appearing.   HENT:      Head: Normocephalic and atraumatic.      Right Ear: Tympanic membrane, ear canal and external ear normal.      Left Ear: Tympanic " membrane, ear canal and external ear normal.      Nose: Congestion present. No rhinorrhea.      Mouth/Throat:      Mouth: Mucous membranes are moist.      Pharynx: Oropharyngeal exudate and posterior oropharyngeal erythema present. No pharyngeal swelling, uvula swelling or postnasal drip.      Tonsils: Tonsillar exudate present. No tonsillar abscesses.        Comments: Few white patchy tonsillar exudates noted, no signs of PTA, airway is unobstructed  Eyes:      General:         Right eye: No discharge.         Left eye: No discharge.      Pupils: Pupils are equal, round, and reactive to light.   Cardiovascular:      Rate and Rhythm: Normal rate.   Pulmonary:      Effort: Pulmonary effort is normal. No respiratory distress.      Breath sounds: No stridor. No wheezing, rhonchi or rales.   Chest:      Chest wall: No tenderness.   Abdominal:      General: Abdomen is flat.      Tenderness: There is no abdominal tenderness. There is no guarding.   Musculoskeletal:      Cervical back: Normal range of motion. No rigidity or tenderness.   Lymphadenopathy:      Cervical: No cervical adenopathy.   Neurological:      Mental Status: She is alert.         Assessment/Plan:     Diagnosis and associated orders:     1. Exudative pharyngitis  - POCT Mononucleosis (mono)  - POCT CEPHEID GROUP A STREP - PCR  - CULTURE THROAT; Future  - methylPREDNISolone (MEDROL DOSEPAK) 4 MG Tablet Therapy Pack; Follow schedule on package instructions.  Dispense: 21 Tablet; Refill: 0  - lidocaine (XYLOCAINE) 2 % Solution; 5mL orally, may be applied as a swish and spit up to three times daily as needed for throat pain  Dispense: 100 mL; Refill: 0    2. Laryngitis  - methylPREDNISolone (MEDROL DOSEPAK) 4 MG Tablet Therapy Pack; Follow schedule on package instructions.  Dispense: 21 Tablet; Refill: 0  - lidocaine (XYLOCAINE) 2 % Solution; 5mL orally, may be applied as a swish and spit up to three times daily as needed for throat pain  Dispense: 100 mL;  Refill: 0    3. Other fatigue  - POCT Mononucleosis (mono)    4. Acute cough  - methylPREDNISolone (MEDROL DOSEPAK) 4 MG Tablet Therapy Pack; Follow schedule on package instructions.  Dispense: 21 Tablet; Refill: 0     Comments/MDM:     Patient history and physical exam consistent with acute exudative tonsillitis with concomitant cough and fatigue.  Patient presents well in clinic no red flag symptoms endorsed found on physical exam  I discussed HPI as well as physical exam findings with patient, I have high suspicion this is residual from patient's original illness, though I did state to her with how bad she endorses her throat pain as well as concomitant fatigue and no improvement on antibiotics there is some suspicion for viral exudative pharyngitis, primarily mononucleosis.  I discussed doing strep test to ensure no strep etiology, will also do in clinic Monospot.  Strep swab negative  Monospot negative  Outpatient management will consist of copious fluids and electrolytes, staggered Tylenol and ibuprofen, short course of Medrol for inflammation and cough, viscous lidocaine gargle and spit for throat pain, change toothbrush, clean bed sheets, disinfect anything touched often, monitor symptoms  Follow up in 3-5 days if no improvement in symptoms           Differential diagnosis, natural history, supportive care, and indications for immediate follow-up discussed.    Advised the patient to follow-up with the primary care physician for recheck, reevaluation, and consideration of further management.    Please note that this dictation was created using voice recognition software. I have made a reasonable attempt to correct obvious errors, but I expect that there are errors of grammar and possibly content that I did not discover before finalizing the note.    This note was electronically signed by DOMINGO Case

## 2025-02-10 LAB
BACTERIA SPEC RESP CULT: NORMAL
SIGNIFICANT IND 70042: NORMAL
SITE SITE: NORMAL
SOURCE SOURCE: NORMAL

## 2025-09-12 ENCOUNTER — APPOINTMENT (OUTPATIENT)
Dept: MEDICAL GROUP | Facility: PHYSICIAN GROUP | Age: 25
End: 2025-09-12
Payer: COMMERCIAL